# Patient Record
Sex: MALE | Race: BLACK OR AFRICAN AMERICAN | Employment: OTHER | URBAN - METROPOLITAN AREA
[De-identification: names, ages, dates, MRNs, and addresses within clinical notes are randomized per-mention and may not be internally consistent; named-entity substitution may affect disease eponyms.]

---

## 2018-09-10 ENCOUNTER — APPOINTMENT (OUTPATIENT)
Dept: ULTRASOUND IMAGING | Age: 68
End: 2018-09-10
Attending: EMERGENCY MEDICINE
Payer: MEDICARE

## 2018-09-10 ENCOUNTER — APPOINTMENT (OUTPATIENT)
Dept: CT IMAGING | Age: 68
End: 2018-09-10
Attending: EMERGENCY MEDICINE
Payer: MEDICARE

## 2018-09-10 ENCOUNTER — HOSPITAL ENCOUNTER (EMERGENCY)
Age: 68
Discharge: HOME | End: 2018-09-10
Attending: EMERGENCY MEDICINE
Payer: MEDICARE

## 2018-09-10 VITALS
WEIGHT: 315 LBS | BODY MASS INDEX: 39.17 KG/M2 | HEIGHT: 75 IN | SYSTOLIC BLOOD PRESSURE: 134 MMHG | HEART RATE: 80 BPM | OXYGEN SATURATION: 93 % | RESPIRATION RATE: 17 BRPM | TEMPERATURE: 98.1 F | DIASTOLIC BLOOD PRESSURE: 87 MMHG

## 2018-09-10 DIAGNOSIS — S76.212A GROIN STRAIN, LEFT, INITIAL ENCOUNTER: Primary | ICD-10-CM

## 2018-09-10 DIAGNOSIS — R91.1 PULMONARY NODULE: ICD-10-CM

## 2018-09-10 LAB
ALBUMIN SERPL-MCNC: 3.7 G/DL (ref 3.5–5)
ALBUMIN/GLOB SERPL: 0.9 {RATIO} (ref 1.1–2.2)
ALP SERPL-CCNC: 90 U/L (ref 45–117)
ALT SERPL-CCNC: 29 U/L (ref 12–78)
ANION GAP SERPL CALC-SCNC: 9 MMOL/L (ref 5–15)
APPEARANCE UR: CLEAR
AST SERPL-CCNC: 13 U/L (ref 15–37)
BACTERIA URNS QL MICRO: NEGATIVE /HPF
BASOPHILS # BLD: 0.2 K/UL (ref 0–0.1)
BASOPHILS NFR BLD: 1 % (ref 0–1)
BILIRUB SERPL-MCNC: 0.3 MG/DL (ref 0.2–1)
BILIRUB UR QL: NEGATIVE
BUN SERPL-MCNC: 13 MG/DL (ref 6–20)
BUN/CREAT SERPL: 13 (ref 12–20)
CALCIUM SERPL-MCNC: 8.7 MG/DL (ref 8.5–10.1)
CHLORIDE SERPL-SCNC: 99 MMOL/L (ref 97–108)
CO2 SERPL-SCNC: 25 MMOL/L (ref 21–32)
COLOR UR: NORMAL
CREAT SERPL-MCNC: 1.02 MG/DL (ref 0.7–1.3)
DIFFERENTIAL METHOD BLD: ABNORMAL
EOSINOPHIL # BLD: 0.3 K/UL (ref 0–0.4)
EOSINOPHIL NFR BLD: 2 % (ref 0–7)
EPITH CASTS URNS QL MICRO: NORMAL /LPF
ERYTHROCYTE [DISTWIDTH] IN BLOOD BY AUTOMATED COUNT: 14.3 % (ref 11.5–14.5)
GLOBULIN SER CALC-MCNC: 4 G/DL (ref 2–4)
GLUCOSE SERPL-MCNC: 196 MG/DL (ref 65–100)
GLUCOSE UR STRIP.AUTO-MCNC: NEGATIVE MG/DL
HCT VFR BLD AUTO: 44 % (ref 36.6–50.3)
HGB BLD-MCNC: 14.5 G/DL (ref 12.1–17)
HGB UR QL STRIP: NEGATIVE
HYALINE CASTS URNS QL MICRO: NORMAL /LPF (ref 0–5)
IMM GRANULOCYTES # BLD: 0.5 K/UL (ref 0–0.04)
IMM GRANULOCYTES NFR BLD AUTO: 3 % (ref 0–0.5)
KETONES UR QL STRIP.AUTO: NEGATIVE MG/DL
LEUKOCYTE ESTERASE UR QL STRIP.AUTO: NEGATIVE
LYMPHOCYTES # BLD: 2.5 K/UL (ref 0.8–3.5)
LYMPHOCYTES NFR BLD: 15 % (ref 12–49)
MCH RBC QN AUTO: 27.4 PG (ref 26–34)
MCHC RBC AUTO-ENTMCNC: 33 G/DL (ref 30–36.5)
MCV RBC AUTO: 83.2 FL (ref 80–99)
MONOCYTES # BLD: 1.2 K/UL (ref 0–1)
MONOCYTES NFR BLD: 7 % (ref 5–13)
NEUTS SEG # BLD: 12.2 K/UL (ref 1.8–8)
NEUTS SEG NFR BLD: 72 % (ref 32–75)
NITRITE UR QL STRIP.AUTO: NEGATIVE
NRBC # BLD: 0 K/UL (ref 0–0.01)
NRBC BLD-RTO: 0 PER 100 WBC
PH UR STRIP: 5 [PH] (ref 5–8)
PLATELET # BLD AUTO: 249 K/UL (ref 150–400)
PMV BLD AUTO: 11.3 FL (ref 8.9–12.9)
POTASSIUM SERPL-SCNC: 4.4 MMOL/L (ref 3.5–5.1)
PROT SERPL-MCNC: 7.7 G/DL (ref 6.4–8.2)
PROT UR STRIP-MCNC: NEGATIVE MG/DL
RBC # BLD AUTO: 5.29 M/UL (ref 4.1–5.7)
RBC #/AREA URNS HPF: NORMAL /HPF (ref 0–5)
RBC MORPH BLD: ABNORMAL
SODIUM SERPL-SCNC: 133 MMOL/L (ref 136–145)
SP GR UR REFRACTOMETRY: 1.01 (ref 1–1.03)
UA: UC IF INDICATED,UAUC: NORMAL
UROBILINOGEN UR QL STRIP.AUTO: 1 EU/DL (ref 0.2–1)
WBC # BLD AUTO: 16.9 K/UL (ref 4.1–11.1)
WBC URNS QL MICRO: NORMAL /HPF (ref 0–4)

## 2018-09-10 PROCEDURE — 96360 HYDRATION IV INFUSION INIT: CPT

## 2018-09-10 PROCEDURE — 74011250636 HC RX REV CODE- 250/636: Performed by: EMERGENCY MEDICINE

## 2018-09-10 PROCEDURE — 99285 EMERGENCY DEPT VISIT HI MDM: CPT

## 2018-09-10 PROCEDURE — 36415 COLL VENOUS BLD VENIPUNCTURE: CPT | Performed by: EMERGENCY MEDICINE

## 2018-09-10 PROCEDURE — 74177 CT ABD & PELVIS W/CONTRAST: CPT

## 2018-09-10 PROCEDURE — 85025 COMPLETE CBC W/AUTO DIFF WBC: CPT | Performed by: EMERGENCY MEDICINE

## 2018-09-10 PROCEDURE — 74011636320 HC RX REV CODE- 636/320: Performed by: EMERGENCY MEDICINE

## 2018-09-10 PROCEDURE — 80053 COMPREHEN METABOLIC PANEL: CPT | Performed by: EMERGENCY MEDICINE

## 2018-09-10 PROCEDURE — 76870 US EXAM SCROTUM: CPT

## 2018-09-10 PROCEDURE — 81001 URINALYSIS AUTO W/SCOPE: CPT | Performed by: EMERGENCY MEDICINE

## 2018-09-10 RX ORDER — HYDROCODONE BITARTRATE AND ACETAMINOPHEN 5; 325 MG/1; MG/1
1 TABLET ORAL
Qty: 15 TAB | Refills: 0 | Status: SHIPPED | OUTPATIENT
Start: 2018-09-10 | End: 2018-10-30 | Stop reason: ALTCHOICE

## 2018-09-10 RX ORDER — SODIUM CHLORIDE 9 MG/ML
50 INJECTION, SOLUTION INTRAVENOUS
Status: COMPLETED | OUTPATIENT
Start: 2018-09-10 | End: 2018-09-10

## 2018-09-10 RX ORDER — SODIUM CHLORIDE 0.9 % (FLUSH) 0.9 %
10 SYRINGE (ML) INJECTION
Status: COMPLETED | OUTPATIENT
Start: 2018-09-10 | End: 2018-09-10

## 2018-09-10 RX ADMIN — SODIUM CHLORIDE 50 ML/HR: 900 INJECTION, SOLUTION INTRAVENOUS at 09:12

## 2018-09-10 RX ADMIN — Medication 10 ML: at 09:12

## 2018-09-10 RX ADMIN — SODIUM CHLORIDE 500 ML: 900 INJECTION, SOLUTION INTRAVENOUS at 10:44

## 2018-09-10 RX ADMIN — IOPAMIDOL 100 ML: 755 INJECTION, SOLUTION INTRAVENOUS at 09:12

## 2018-09-10 NOTE — ED TRIAGE NOTES
Pt. Arrives via EMS with complaints of left sided groin dora that began 2 days ago. This morning the pain worsened and he began to have trouble with ambulation Reports he cannot put pressure on the left leg. Pt. Reports he smokes 1 pack of cigarettes per day and has visible trouble breathing, reports this is his normal breathing pattern Denies chest pain and denies SOB worse than his baseline.  
Placed on the monitor x3

## 2018-09-10 NOTE — ED PROVIDER NOTES
EMERGENCY DEPARTMENT HISTORY AND PHYSICAL EXAM 
 
 
Date: 9/10/2018 Patient Name: Kofi Ware History of Presenting Illness Chief Complaint Patient presents with  Groin Pain History Provided By: Patient HPI: Kofi Ware, 79 y.o. male with PMHx significant for HTN, HLD, and DM, presents via EMS to the ED with cc of intermittent, mild to moderate left groin pain that radiates into the left testicle x 2 days, but worsening this morning. He states his pain is exacerbated with ambulation and improved with rest. Pt reports prior to onset of sx's he had been running errands, but denies any recent heavy lifting or exertion. He denies prior hx of similar sx's. Pt specifically denies any scrotal swelling, rash, nausea, vomiting, constipation, diarrhea, dysuria, frequency, and fever. There are no other complaints, changes, or physical findings at this time. PCP: Jaci Mckeon NP Past History Past Medical History: No past medical history on file. Past Surgical History: No past surgical history on file. Family History: No family history on file. Social History: 
Social History Substance Use Topics  Smoking status: Not on file  Smokeless tobacco: Not on file  Alcohol use Not on file Allergies: 
No Known Allergies Review of Systems Review of Systems Constitutional: Negative for activity change, chills and fever. HENT: Negative for congestion and sore throat. Eyes: Negative for pain and redness. Respiratory: Negative for cough, chest tightness and shortness of breath. Cardiovascular: Negative for chest pain and palpitations. Gastrointestinal: Negative for abdominal pain, constipation, diarrhea, nausea and vomiting. Genitourinary: Positive for testicular pain (left). Negative for dysuria, frequency, scrotal swelling and urgency. + left groin pain Musculoskeletal: Negative for back pain and neck pain. Skin: Negative for rash. Neurological: Negative for syncope, light-headedness and headaches. Psychiatric/Behavioral: Negative for confusion. All other systems reviewed and are negative. Physical Exam  
Physical Exam  
Constitutional: He is oriented to person, place, and time. He appears well-developed and well-nourished. No distress. HENT:  
Head: Normocephalic. Nose: Nose normal.  
Mouth/Throat: Oropharynx is clear and moist. No oropharyngeal exudate. Eyes: Conjunctivae are normal. Pupils are equal, round, and reactive to light. No scleral icterus. Neck: Normal range of motion. Neck supple. No JVD present. No tracheal deviation present. No thyromegaly present. Cardiovascular: Normal rate, regular rhythm and intact distal pulses. Exam reveals no gallop and no friction rub. No murmur heard. Pulmonary/Chest: Effort normal and breath sounds normal. No stridor. No respiratory distress. He has no wheezes. He has no rales. Abdominal: Soft. Bowel sounds are normal. He exhibits no distension. There is no tenderness. There is no rebound and no guarding. Genitourinary:  
Genitourinary Comments: Mild left testicular swelling and tenderness Musculoskeletal: Normal range of motion. He exhibits no edema. Lymphadenopathy:  
  He has no cervical adenopathy. Neurological: He is alert and oriented to person, place, and time. No cranial nerve deficit. He exhibits normal muscle tone. Coordination normal.  
Skin: Skin is warm and dry. No rash noted. He is not diaphoretic. No erythema. Psychiatric: He has a normal mood and affect. His behavior is normal.  
Nursing note and vitals reviewed. Diagnostic Study Results Labs - Recent Results (from the past 12 hour(s)) CBC WITH AUTOMATED DIFF Collection Time: 09/10/18  7:44 AM  
Result Value Ref Range WBC 16.9 (H) 4.1 - 11.1 K/uL  
 RBC 5.29 4. 10 - 5.70 M/uL  
 HGB 14.5 12.1 - 17.0 g/dL HCT 44.0 36.6 - 50.3 %  MCV 83.2 80.0 - 99.0 FL  
 MCH 27.4 26.0 - 34.0 PG  
 MCHC 33.0 30.0 - 36.5 g/dL  
 RDW 14.3 11.5 - 14.5 % PLATELET 474 836 - 941 K/uL MPV 11.3 8.9 - 12.9 FL  
 NRBC 0.0 0  WBC ABSOLUTE NRBC 0.00 0.00 - 0.01 K/uL NEUTROPHILS 72 32 - 75 % LYMPHOCYTES 15 12 - 49 % MONOCYTES 7 5 - 13 % EOSINOPHILS 2 0 - 7 % BASOPHILS 1 0 - 1 % IMMATURE GRANULOCYTES 3 (H) 0.0 - 0.5 % ABS. NEUTROPHILS 12.2 (H) 1.8 - 8.0 K/UL  
 ABS. LYMPHOCYTES 2.5 0.8 - 3.5 K/UL  
 ABS. MONOCYTES 1.2 (H) 0.0 - 1.0 K/UL  
 ABS. EOSINOPHILS 0.3 0.0 - 0.4 K/UL  
 ABS. BASOPHILS 0.2 (H) 0.0 - 0.1 K/UL  
 ABS. IMM. GRANS. 0.5 (H) 0.00 - 0.04 K/UL  
 DF AUTOMATED    
 RBC COMMENTS NORMOCYTIC, NORMOCHROMIC METABOLIC PANEL, COMPREHENSIVE Collection Time: 09/10/18  7:44 AM  
Result Value Ref Range Sodium 133 (L) 136 - 145 mmol/L Potassium 4.4 3.5 - 5.1 mmol/L Chloride 99 97 - 108 mmol/L  
 CO2 25 21 - 32 mmol/L Anion gap 9 5 - 15 mmol/L Glucose 196 (H) 65 - 100 mg/dL BUN 13 6 - 20 MG/DL Creatinine 1.02 0.70 - 1.30 MG/DL  
 BUN/Creatinine ratio 13 12 - 20 GFR est AA >60 >60 ml/min/1.73m2 GFR est non-AA >60 >60 ml/min/1.73m2 Calcium 8.7 8.5 - 10.1 MG/DL Bilirubin, total 0.3 0.2 - 1.0 MG/DL  
 ALT (SGPT) 29 12 - 78 U/L  
 AST (SGOT) 13 (L) 15 - 37 U/L Alk. phosphatase 90 45 - 117 U/L Protein, total 7.7 6.4 - 8.2 g/dL Albumin 3.7 3.5 - 5.0 g/dL Globulin 4.0 2.0 - 4.0 g/dL A-G Ratio 0.9 (L) 1.1 - 2.2 URINALYSIS W/ REFLEX CULTURE Collection Time: 09/10/18 11:30 AM  
Result Value Ref Range Color YELLOW/STRAW Appearance CLEAR CLEAR Specific gravity 1.010 1.003 - 1.030    
 pH (UA) 5.0 5.0 - 8.0 Protein NEGATIVE  NEG mg/dL Glucose NEGATIVE  NEG mg/dL Ketone NEGATIVE  NEG mg/dL Bilirubin NEGATIVE  NEG Blood NEGATIVE  NEG Urobilinogen 1.0 0.2 - 1.0 EU/dL Nitrites NEGATIVE  NEG  Leukocyte Esterase NEGATIVE  NEG    
 UA: UC IF INDICATED CULTURE NOT INDICATED BY UA RESULT CNI    
 WBC 0-4 0 - 4 /hpf  
 RBC 0-5 0 - 5 /hpf Epithelial cells FEW FEW /lpf Bacteria NEGATIVE  NEG /hpf Hyaline cast 0-2 0 - 5 /lpf Radiologic Studies -  
US SCROTUM/TESTICLES Final Result EXAM:  US SCROTUM/TESTICLES 
  
INDICATION:  Left groin pain for 2 days. 
  
COMPARISON:  None. 
  
TECHNIQUE:  Real time and color Doppler ultrasound of the scrotal contents. 
  
FINDINGS: The right testicle measures 4.4 x 3.2 x 2.5 cm. The left testicle 
measures 5.0 x 2.6 x 2.3 cm. The testicles are normal in size and homogeneous in 
echotexture without focal lesions. There is normal arterial flow bilaterally. 
  
The epididymis is normal bilaterally. Epididymal head cysts measure 1.1 cm on 
the right and 0.8 cm on the left. There is no significant hydrocele or 
varicocele.   
  
There is no evidence for left inguinal hernia. 
  
IMPRESSION IMPRESSION:   
Normal appearance of the testicles with normal flow. No evidence for left 
inguinal hernia. CT Results  (Last 48 hours) 09/10/18 0915  CT ABD PELV W CONT Final result Impression:  IMPRESSION:   
1. There is no acute abnormality in the abdomen or pelvis. There is no   
calculus or hydronephrosis. 2. Left renal lesions are incompletely characterized due to body habitus. While  
these may represent cysts, solid lesions are not excluded. Ultrasound may be  
helpful for further characterization. 3. Hepatic steatosis. 4. Nonspecific 5 mm left lower lobe lung nodule. Guidelines by the Fleischner  
society (Radiology 2017 special report) suggest that patients with low risk for  
lung cancer and solid nodule(s) less than or equal to 6 mm in diameter require  
no follow-up. In patients with a higher risk, such as smokers, follow-up  
noncontrast chest CT should be considered at 12 months. Patients with a known malignancy are at increased risk for metastasis and should receive a three month  
follow-up. Narrative:  EXAM:  CT ABD PELV W CONT INDICATION: Left groin and pelvic pain for 2 days COMPARISON: None. TECHNIQUE: Helical CT of the abdomen  and pelvis  following the uneventful  
intravenous administration of nonionic contrast.  Coronal and sagittal reformats  
are performed. CT dose reduction was achieved through use of a standardized  
protocol tailored for this examination and automatic exposure control for dose  
modulation. FINDINGS:   
The visualized lung bases demonstrate no mass or consolidation. There is a 5 mm  
subpleural lung nodule in the left lower lobe (series 2, image 8). The heart  
size is normal. There is coronary artery atherosclerosis. There is no  
pericardial or pleural effusion. The liver is diffusely low in attenuation. The spleen, pancreas, and adrenal  
glands are normal. The gall bladder is present  without intra- or extra-hepatic  
biliary dilatation. The kidneys are symmetric without hydronephrosis. Left renal lesions are  
incompletely characterized, the larger measuring 2.5 cm. There are no dilated bowel loops. The appendix is normal.    
   
There are no enlarged lymph nodes. There is no free fluid or free air. The  
aorta tapers without aneurysm. There is mild aortoiliac atherosclerosis. The urinary bladder is normal.  Pelvic phleboliths are noted. There is no pelvic  
mass. The prostate is not enlarged. There is degenerative change at L5-S1 with bilateral L5 pars defects and 14 mm  
of anterolisthesis. There is no aggressive bony lesion. Medical Decision Making I am the first provider for this patient. I reviewed the vital signs, available nursing notes, past medical history, past surgical history, family history and social history. Vital Signs-Reviewed the patient's vital signs. Patient Vitals for the past 12 hrs: 
 Temp Pulse Resp BP SpO2  
09/10/18 1200 - 80 17 134/87 93 % 09/10/18 1145 - 79 27 - 93 % 09/10/18 1130 - - 18 - 93 % 09/10/18 1115 - 91 23 - 93 % 09/10/18 1100 - 83 16 127/76 93 % 09/10/18 1046 - 83 22 125/76 93 % 09/10/18 1045 - 83 19 - 93 % 09/10/18 1044 - 85 24 - 92 % 09/10/18 1043 - 83 25 - 93 % 09/10/18 1042 - 87 24 - 92 % 09/10/18 1041 - 92 29 - 92 % 09/10/18 1040 - 95 25 - 92 % 09/10/18 1039 - 86 23 - 93 % 09/10/18 1038 - 87 24 - 91 % 09/10/18 1037 - 88 20 - 93 % 09/10/18 1036 - 83 27 - 91 % 09/10/18 1035 - 93 24 - 92 % 09/10/18 1034 - 93 24 - 93 % 09/10/18 1033 - 87 - - 91 % 09/10/18 0932 - 82 24 - 93 % 09/10/18 0931 - 79 23 - 93 % 09/10/18 0930 - 77 24 - 93 % 09/10/18 0929 - 80 27 - 92 % 09/10/18 0928 - 86 25 - 94 % 09/10/18 0921 - - - 155/66 -  
09/10/18 0905 - 86 21 - 94 % 09/10/18 0904 - 86 21 - 93 % 09/10/18 0903 - 88 23 - 94 % 09/10/18 0902 - 86 22 - 94 % 09/10/18 0901 - 90 22 - 93 % 09/10/18 0900 - 82 20 - 93 % 09/10/18 0859 - 86 23 - 94 % 09/10/18 0858 - 84 18 - 93 % 09/10/18 0730 - 89 18 163/78 94 % 09/10/18 0722 - - - 155/67 -  
09/10/18 0720 98.1 °F (36.7 °C) 82 21 155/67 94 % Pulse Oximetry Analysis - 94% on RA Cardiac Monitor:  
Rate: 82 bpm 
Rhythm: Normal Sinus Rhythm Records Reviewed: Nursing Notes, Old Medical Records and Ambulance Run Sheet Provider Notes (Medical Decision Making): DDx: inguinal hernia, epididymitis, orchitis, groin strain ED Course:  
Initial assessment performed. The patients presenting problems have been discussed, and they are in agreement with the care plan formulated and outlined with them. I have encouraged them to ask questions as they arise throughout their visit. PROGRESS NOTE: 
7:30 AM 
Pt defers pain medication at this time. Written by Elbert Anthony ED Scribe, as dictated by Liliana Olmstead MD. Critical Care Time: 0 Disposition: 
DISCHARGE NOTE: 
12:18 PM 
The patient is ready for discharge. The patients signs, symptoms, diagnosis, and instructions for discharge have been discussed and the pt has conveyed their understanding. The patient is to follow up as recommended with PCP or return to the ER should their symptoms worsen. Plan has been discussed and patient has conveyed their agreement. Suspect groin strain as cause of sx; scrotal us without acute findings; abd pelvis ct also without acute findings; no evidence of hernia; bony pelvis/hip wnl; elevated wbc without known cause; no systemic sx, f/c, n/v; dc home with pcp follow up; he agreed to follow up regarding repeat cbc as well as repeat chest ct in 12 months to eval incidental pulmonary nodule, 5mm; he also agreed to return to ed if any worsening pain, weakness, fevers; Jessica Warren MD 
 
 
PLAN: 
1. Discharge home Discharge Medication List as of 9/10/2018 12:18 PM  
  
START taking these medications Details HYDROcodone-acetaminophen (NORCO) 5-325 mg per tablet Take 1 Tab by mouth every four (4) hours as needed for Pain. Max Daily Amount: 6 Tabs., Print, Disp-15 Tab, R-0  
  
  
 
2. Follow-up Information Follow up With Details Comments Contact Info Aron Brown NP Schedule an appointment as soon as possible for a visit in 1 day  [address] [facility] [address] [place] [address] [phone] [facility] EMERGENCY DEPT Go in 1 day If symptoms worsen [address] [facility] 
[phone] Return to ED if worse Diagnosis Clinical Impression: 1. Groin strain, left, initial encounter 2. Pulmonary nodule Attestations: This note is prepared by Jeaneth Hwang, acting as Scribe for Jessica Warren MD. Jessica Warren MD: The scribe's documentation has been prepared under my direction and personally reviewed by me in its entirety.  I confirm that the note above accurately reflects all work, treatment, procedures, and medical decision making performed by me. This note will not be viewable in [address]

## 2018-09-10 NOTE — ED NOTES
Pt transported to [address] [facility] via stretcher by radiology tech

## 2018-09-10 NOTE — DISCHARGE INSTRUCTIONS
Learning About Lung Nodules  What is a lung nodule? A lung nodule is a growth in the lung. A single nodule surrounded by lung tissue is called a solitary pulmonary nodule. A lung nodule might not cause any symptoms. Your doctor may have found one or more nodules on your lung when you were having a chest X-ray or CT scan. Or it may have been found during a lung cancer screening. A lung nodule may be caused by an old infection or cancer. It might also be a noncancerous growth. Lung nodules can cause a screening to give an abnormal result. Most nodules do not cause any harm. But without further tests, your doctor can't tell whether an abnormal finding is cancer, a harmless nodule, or something else. What can you expect when you have a lung nodule? Your doctor will look at several risk factors to see how likely it is that the nodule is cancer. He or she will look at:  · Whether you smoke or have ever smoked. · Your age and your family's medical history. · Whether you have ever had lung cancer. · The size and shape of the nodule. · Whether the nodule has changed in size. Your doctor may look at past chest X-rays or CT scans, if available, and compare them. Or you may have a series of CT scans to see if the nodule grows over time. What happens next depends on the risk of the nodule being cancer. · If you have no risk factors and the nodule is small, your doctor may advise doing nothing. · If the risk is small, your doctor may schedule follow-up appointments and tests. You may have more CT scans later to see if the nodule is growing. If the nodule hasn't changed in 3 to 6 months, you may have CT scans every year. If it hasn't changed in 2 years, you may not need any more tests. · If there's a higher risk of cancer, your doctor may:  Teressa Chand a PET scan, which may help tell if the nodule is cancerous or not. ¨ Take a sample of tissue from the nodule for testing. This is called a biopsy.   ¨ Remove the nodule with surgery. Follow-up care is a key part of your treatment and safety. Be sure to make and go to all appointments, and call your doctor if you are having problems. It's also a good idea to know your test results and keep a list of the medicines you take. Where can you learn more? Go to http://herman-tamika.info/. Enter M572 in the search box to learn more about \"Learning About Lung Nodules. \"  Current as of: May 12, 2017  Content Version: 11.7  © 9241-8026 [employer]. Care instructions adapted under license by [employer] (which disclaims liability or warranty for this information). If you have questions about a medical condition or this instruction, always ask your healthcare professional. [address] any warranty or liability for your use of this information. Groin Strain: Care Instructions  Your Care Instructions  A groin strain is an injury that happens when you tear or overstretch (pull) a groin muscle. The groin muscles are in the area on either side of the body in the folds where the belly joins the legs. You can strain a groin muscle during exercise, such as running, skating, kicking in soccer, or playing basketball. It can happen when you lift, push, or pull heavy objects. You might pull a groin muscle when you fall. The injury can range from a minor pull to a more serious tear of the muscle. You may feel pain and tenderness that's worse when you squeeze your legs together. You may also have pain when you raise the knee of the injured side. There may be swelling or bruising in the groin area or inner thigh. If you have a bad strain, you may walk with a limp while it heals. Rest and other home care can help the muscle heal. Healing can take up to 3 weeks or more. Your doctor may want to see you again in 2 to 3 weeks. Follow-up care is a key part of your treatment and safety.  Be sure to make and go to all appointments, and call your doctor if you are having problems. It's also a good idea to know your test results and keep a list of the medicines you take. How can you care for yourself at home? · Be safe with medicines. Read and follow all instructions on the label. ¨ If the doctor gave you a prescription medicine for pain, take it as prescribed. ¨ If you are not taking a prescription pain medicine, ask your doctor if you can take an over-the-counter medicine. · Rest and protect your injured or sore groin area for 1 to 2 weeks. Stop, change, or take a break from any activity that may be causing your pain or soreness. Do not do intense activities while you still have pain. · Put ice or a cold pack on your groin area for 10 to 20 minutes at a time. Try to do this every 1 to 2 hours for the next 3 days (when you are awake) or until the swelling goes down. Put a thin cloth between the ice and your skin. · After 2 or 3 days, if your swelling is gone, apply heat. Put a warm water bottle, a heating pad set on low, or a warm cloth on your groin area. Do not go to sleep with a heating pad on your skin. · If your doctor gave you crutches, make sure you use them as directed. · Wear snug shorts or underwear that support the injured area. When should you call for help? Call your doctor now or seek immediate medical care if:    · You have new or severe pain or swelling in the groin area.     · Your groin or upper thigh is cool or pale or changes color.     · You have tingling, weakness, or numbness in your groin or leg.     · You cannot move your leg.     · You cannot put weight on your leg.    Watch closely for changes in your health, and be sure to contact your doctor if:    · You do not get better as expected. Where can you learn more? Go to http://herman-tamika.info/. Enter P390 in the search box to learn more about \"Groin Strain: Care Instructions. \"  Current as of: November 29, 2017  Content Version: 11.7  © 7153-6435 [employer]. Care instructions adapted under license by [employer] (which disclaims liability or warranty for this information). If you have questions about a medical condition or this instruction, always ask your healthcare professional. [place] [address] any warranty or liability for your use of this information.

## 2018-09-10 NOTE — Clinical Note
The radiologist recommended you have a repeat chest ct scan in 12 months to reevaluate a pulmonary nodule found today.

## 2018-10-30 ENCOUNTER — APPOINTMENT (OUTPATIENT)
Dept: MRI IMAGING | Age: 68
DRG: 064 | End: 2018-10-30
Attending: INTERNAL MEDICINE
Payer: MEDICARE

## 2018-10-30 ENCOUNTER — APPOINTMENT (OUTPATIENT)
Dept: GENERAL RADIOLOGY | Age: 68
DRG: 064 | End: 2018-10-30
Attending: EMERGENCY MEDICINE
Payer: MEDICARE

## 2018-10-30 ENCOUNTER — APPOINTMENT (OUTPATIENT)
Dept: CT IMAGING | Age: 68
DRG: 064 | End: 2018-10-30
Attending: EMERGENCY MEDICINE
Payer: MEDICARE

## 2018-10-30 ENCOUNTER — HOSPITAL ENCOUNTER (INPATIENT)
Age: 68
LOS: 5 days | Discharge: NURSING, REHAB OR LONG-TERM CARE | DRG: 064 | End: 2018-11-04
Attending: EMERGENCY MEDICINE | Admitting: INTERNAL MEDICINE
Payer: MEDICARE

## 2018-10-30 ENCOUNTER — APPOINTMENT (OUTPATIENT)
Dept: ULTRASOUND IMAGING | Age: 68
DRG: 064 | End: 2018-10-30
Attending: INTERNAL MEDICINE
Payer: MEDICARE

## 2018-10-30 DIAGNOSIS — H54.62 VISION LOSS OF LEFT EYE: ICD-10-CM

## 2018-10-30 DIAGNOSIS — I63.331 CEREBROVASCULAR ACCIDENT (CVA) DUE TO THROMBOSIS OF RIGHT POSTERIOR CEREBRAL ARTERY (HCC): ICD-10-CM

## 2018-10-30 DIAGNOSIS — I63.019 CEREBROVASCULAR ACCIDENT (CVA) DUE TO THROMBOSIS OF VERTEBRAL ARTERY, UNSPECIFIED BLOOD VESSEL LATERALITY (HCC): Primary | ICD-10-CM

## 2018-10-30 DIAGNOSIS — H53.462 LEFT HOMONYMOUS HEMIANOPSIA: ICD-10-CM

## 2018-10-30 DIAGNOSIS — I21.4 NSTEMI (NON-ST ELEVATED MYOCARDIAL INFARCTION) (HCC): ICD-10-CM

## 2018-10-30 DIAGNOSIS — I48.0 PAF (PAROXYSMAL ATRIAL FIBRILLATION) (HCC): ICD-10-CM

## 2018-10-30 DIAGNOSIS — Z72.0 TOBACCO ABUSE: ICD-10-CM

## 2018-10-30 DIAGNOSIS — I48.0 PAROXYSMAL ATRIAL FIBRILLATION (HCC): ICD-10-CM

## 2018-10-30 PROBLEM — G45.9 TIA (TRANSIENT ISCHEMIC ATTACK): Status: ACTIVE | Noted: 2018-10-30

## 2018-10-30 PROBLEM — R77.8 ELEVATED TROPONIN: Status: ACTIVE | Noted: 2018-10-30

## 2018-10-30 PROBLEM — I63.9 STROKE (CEREBRUM) (HCC): Status: ACTIVE | Noted: 2018-10-30

## 2018-10-30 LAB
ALBUMIN SERPL-MCNC: 3.6 G/DL (ref 3.5–5)
ALBUMIN/GLOB SERPL: 1 {RATIO} (ref 1.1–2.2)
ALP SERPL-CCNC: 87 U/L (ref 45–117)
ALT SERPL-CCNC: 31 U/L (ref 12–78)
AMPHET UR QL SCN: NEGATIVE
ANION GAP SERPL CALC-SCNC: 5 MMOL/L (ref 5–15)
APPEARANCE UR: CLEAR
APTT PPP: 26.2 SEC (ref 22.1–32)
AST SERPL-CCNC: 21 U/L (ref 15–37)
BACTERIA URNS QL MICRO: NEGATIVE /HPF
BARBITURATES UR QL SCN: NEGATIVE
BASOPHILS # BLD: 0.1 K/UL (ref 0–0.1)
BASOPHILS NFR BLD: 1 % (ref 0–1)
BENZODIAZ UR QL: NEGATIVE
BILIRUB SERPL-MCNC: 0.4 MG/DL (ref 0.2–1)
BILIRUB UR QL: NEGATIVE
BUN SERPL-MCNC: 19 MG/DL (ref 6–20)
BUN/CREAT SERPL: 19 (ref 12–20)
CALCIUM SERPL-MCNC: 8.2 MG/DL (ref 8.5–10.1)
CANNABINOIDS UR QL SCN: NEGATIVE
CHLORIDE SERPL-SCNC: 101 MMOL/L (ref 97–108)
CO2 SERPL-SCNC: 28 MMOL/L (ref 21–32)
COCAINE UR QL SCN: NEGATIVE
COLOR UR: ABNORMAL
COMMENT, HOLDF: NORMAL
CREAT BLD-MCNC: 1 MG/DL (ref 0.6–1.3)
CREAT SERPL-MCNC: 1.01 MG/DL (ref 0.7–1.3)
DIFFERENTIAL METHOD BLD: ABNORMAL
DRUG SCRN COMMENT,DRGCM: NORMAL
EOSINOPHIL # BLD: 0.4 K/UL (ref 0–0.4)
EOSINOPHIL NFR BLD: 2 % (ref 0–7)
EPITH CASTS URNS QL MICRO: ABNORMAL /LPF
ERYTHROCYTE [DISTWIDTH] IN BLOOD BY AUTOMATED COUNT: 13.7 % (ref 11.5–14.5)
GLOBULIN SER CALC-MCNC: 3.7 G/DL (ref 2–4)
GLUCOSE BLD STRIP.AUTO-MCNC: 148 MG/DL (ref 65–100)
GLUCOSE SERPL-MCNC: 164 MG/DL (ref 65–100)
GLUCOSE UR STRIP.AUTO-MCNC: NEGATIVE MG/DL
HCT VFR BLD AUTO: 39.2 % (ref 36.6–50.3)
HGB BLD-MCNC: 12.7 G/DL (ref 12.1–17)
HGB UR QL STRIP: NEGATIVE
HYALINE CASTS URNS QL MICRO: ABNORMAL /LPF (ref 0–5)
IMM GRANULOCYTES # BLD: 0.2 K/UL (ref 0–0.04)
IMM GRANULOCYTES NFR BLD AUTO: 1 % (ref 0–0.5)
INR PPP: 1.2 (ref 0.9–1.1)
KETONES UR QL STRIP.AUTO: NEGATIVE MG/DL
LEUKOCYTE ESTERASE UR QL STRIP.AUTO: NEGATIVE
LYMPHOCYTES # BLD: 2 K/UL (ref 0.8–3.5)
LYMPHOCYTES NFR BLD: 13 % (ref 12–49)
MCH RBC QN AUTO: 27.3 PG (ref 26–34)
MCHC RBC AUTO-ENTMCNC: 32.4 G/DL (ref 30–36.5)
MCV RBC AUTO: 84.3 FL (ref 80–99)
METHADONE UR QL: NEGATIVE
MONOCYTES # BLD: 1.3 K/UL (ref 0–1)
MONOCYTES NFR BLD: 8 % (ref 5–13)
NEUTS SEG # BLD: 11.8 K/UL (ref 1.8–8)
NEUTS SEG NFR BLD: 75 % (ref 32–75)
NITRITE UR QL STRIP.AUTO: NEGATIVE
NRBC # BLD: 0 K/UL (ref 0–0.01)
NRBC BLD-RTO: 0 PER 100 WBC
OPIATES UR QL: NEGATIVE
PCP UR QL: NEGATIVE
PH UR STRIP: 6 [PH] (ref 5–8)
PLATELET # BLD AUTO: 186 K/UL (ref 150–400)
PMV BLD AUTO: 11 FL (ref 8.9–12.9)
POTASSIUM SERPL-SCNC: 4.2 MMOL/L (ref 3.5–5.1)
PROT SERPL-MCNC: 7.3 G/DL (ref 6.4–8.2)
PROT UR STRIP-MCNC: 30 MG/DL
PROTHROMBIN TIME: 12 SEC (ref 9–11.1)
RBC # BLD AUTO: 4.65 M/UL (ref 4.1–5.7)
RBC #/AREA URNS HPF: ABNORMAL /HPF (ref 0–5)
SAMPLES BEING HELD,HOLD: NORMAL
SERVICE CMNT-IMP: ABNORMAL
SODIUM SERPL-SCNC: 134 MMOL/L (ref 136–145)
SP GR UR REFRACTOMETRY: 1.02 (ref 1–1.03)
THERAPEUTIC RANGE,PTTT: NORMAL SECS (ref 58–77)
TROPONIN I SERPL-MCNC: 0.65 NG/ML
TSH SERPL DL<=0.05 MIU/L-ACNC: 0.27 UIU/ML (ref 0.36–3.74)
UA: UC IF INDICATED,UAUC: ABNORMAL
UROBILINOGEN UR QL STRIP.AUTO: 1 EU/DL (ref 0.2–1)
VIT B12 SERPL-MCNC: 291 PG/ML (ref 193–986)
WBC # BLD AUTO: 15.7 K/UL (ref 4.1–11.1)
WBC URNS QL MICRO: ABNORMAL /HPF (ref 0–4)

## 2018-10-30 PROCEDURE — 74011636320 HC RX REV CODE- 636/320: Performed by: EMERGENCY MEDICINE

## 2018-10-30 PROCEDURE — 80053 COMPREHEN METABOLIC PANEL: CPT | Performed by: EMERGENCY MEDICINE

## 2018-10-30 PROCEDURE — 70450 CT HEAD/BRAIN W/O DYE: CPT

## 2018-10-30 PROCEDURE — 84443 ASSAY THYROID STIM HORMONE: CPT | Performed by: PSYCHIATRY & NEUROLOGY

## 2018-10-30 PROCEDURE — 74011250636 HC RX REV CODE- 250/636: Performed by: EMERGENCY MEDICINE

## 2018-10-30 PROCEDURE — 80307 DRUG TEST PRSMV CHEM ANLYZR: CPT | Performed by: PSYCHIATRY & NEUROLOGY

## 2018-10-30 PROCEDURE — 82962 GLUCOSE BLOOD TEST: CPT

## 2018-10-30 PROCEDURE — 85025 COMPLETE CBC W/AUTO DIFF WBC: CPT | Performed by: EMERGENCY MEDICINE

## 2018-10-30 PROCEDURE — 93005 ELECTROCARDIOGRAM TRACING: CPT

## 2018-10-30 PROCEDURE — 85730 THROMBOPLASTIN TIME PARTIAL: CPT | Performed by: EMERGENCY MEDICINE

## 2018-10-30 PROCEDURE — 99285 EMERGENCY DEPT VISIT HI MDM: CPT

## 2018-10-30 PROCEDURE — 85610 PROTHROMBIN TIME: CPT | Performed by: EMERGENCY MEDICINE

## 2018-10-30 PROCEDURE — 82565 ASSAY OF CREATININE: CPT

## 2018-10-30 PROCEDURE — 74011250637 HC RX REV CODE- 250/637: Performed by: EMERGENCY MEDICINE

## 2018-10-30 PROCEDURE — 71045 X-RAY EXAM CHEST 1 VIEW: CPT

## 2018-10-30 PROCEDURE — 74011250636 HC RX REV CODE- 250/636: Performed by: PSYCHIATRY & NEUROLOGY

## 2018-10-30 PROCEDURE — 81001 URINALYSIS AUTO W/SCOPE: CPT | Performed by: EMERGENCY MEDICINE

## 2018-10-30 PROCEDURE — 96372 THER/PROPH/DIAG INJ SC/IM: CPT

## 2018-10-30 PROCEDURE — 65270000029 HC RM PRIVATE

## 2018-10-30 PROCEDURE — 84484 ASSAY OF TROPONIN QUANT: CPT | Performed by: EMERGENCY MEDICINE

## 2018-10-30 PROCEDURE — 36415 COLL VENOUS BLD VENIPUNCTURE: CPT | Performed by: EMERGENCY MEDICINE

## 2018-10-30 PROCEDURE — 70498 CT ANGIOGRAPHY NECK: CPT

## 2018-10-30 PROCEDURE — 93970 EXTREMITY STUDY: CPT

## 2018-10-30 PROCEDURE — 82607 VITAMIN B-12: CPT | Performed by: PSYCHIATRY & NEUROLOGY

## 2018-10-30 RX ORDER — MAGNESIUM SULFATE 100 %
4 CRYSTALS MISCELLANEOUS AS NEEDED
Status: DISCONTINUED | OUTPATIENT
Start: 2018-10-30 | End: 2018-11-04 | Stop reason: HOSPADM

## 2018-10-30 RX ORDER — LISINOPRIL 5 MG/1
5 TABLET ORAL DAILY
COMMUNITY

## 2018-10-30 RX ORDER — ALBUTEROL SULFATE 90 UG/1
2 AEROSOL, METERED RESPIRATORY (INHALATION)
Status: DISCONTINUED | OUTPATIENT
Start: 2018-10-30 | End: 2018-11-04 | Stop reason: HOSPADM

## 2018-10-30 RX ORDER — SODIUM CHLORIDE 0.9 % (FLUSH) 0.9 %
5-10 SYRINGE (ML) INJECTION AS NEEDED
Status: DISCONTINUED | OUTPATIENT
Start: 2018-10-30 | End: 2018-11-04 | Stop reason: HOSPADM

## 2018-10-30 RX ORDER — SODIUM CHLORIDE 0.9 % (FLUSH) 0.9 %
10 SYRINGE (ML) INJECTION
Status: COMPLETED | OUTPATIENT
Start: 2018-10-30 | End: 2018-10-30

## 2018-10-30 RX ORDER — CLOPIDOGREL BISULFATE 75 MG/1
75 TABLET ORAL DAILY
Status: DISCONTINUED | OUTPATIENT
Start: 2018-10-31 | End: 2018-11-01

## 2018-10-30 RX ORDER — SODIUM CHLORIDE 9 MG/ML
75 INJECTION, SOLUTION INTRAVENOUS CONTINUOUS
Status: ACTIVE | OUTPATIENT
Start: 2018-10-30 | End: 2018-10-31

## 2018-10-30 RX ORDER — SODIUM CHLORIDE 0.9 % (FLUSH) 0.9 %
5-10 SYRINGE (ML) INJECTION EVERY 8 HOURS
Status: DISCONTINUED | OUTPATIENT
Start: 2018-10-30 | End: 2018-11-04 | Stop reason: HOSPADM

## 2018-10-30 RX ORDER — BISACODYL 5 MG
5 TABLET, DELAYED RELEASE (ENTERIC COATED) ORAL DAILY PRN
Status: DISCONTINUED | OUTPATIENT
Start: 2018-10-30 | End: 2018-11-04 | Stop reason: HOSPADM

## 2018-10-30 RX ORDER — ENOXAPARIN SODIUM 100 MG/ML
40 INJECTION SUBCUTANEOUS EVERY 12 HOURS
Status: DISCONTINUED | OUTPATIENT
Start: 2018-10-30 | End: 2018-10-31

## 2018-10-30 RX ORDER — METFORMIN HYDROCHLORIDE 1000 MG/1
1000 TABLET ORAL 2 TIMES DAILY WITH MEALS
COMMUNITY

## 2018-10-30 RX ORDER — INSULIN LISPRO 100 [IU]/ML
INJECTION, SOLUTION INTRAVENOUS; SUBCUTANEOUS
Status: DISCONTINUED | OUTPATIENT
Start: 2018-10-30 | End: 2018-11-04 | Stop reason: HOSPADM

## 2018-10-30 RX ORDER — ALBUTEROL SULFATE 90 UG/1
2 AEROSOL, METERED RESPIRATORY (INHALATION)
COMMUNITY

## 2018-10-30 RX ORDER — ACETAMINOPHEN 650 MG/1
650 SUPPOSITORY RECTAL
Status: DISCONTINUED | OUTPATIENT
Start: 2018-10-30 | End: 2018-11-04 | Stop reason: HOSPADM

## 2018-10-30 RX ORDER — ROSUVASTATIN CALCIUM 10 MG/1
10 TABLET, COATED ORAL
COMMUNITY

## 2018-10-30 RX ORDER — LABETALOL HYDROCHLORIDE 5 MG/ML
5 INJECTION, SOLUTION INTRAVENOUS
Status: DISCONTINUED | OUTPATIENT
Start: 2018-10-30 | End: 2018-11-04 | Stop reason: HOSPADM

## 2018-10-30 RX ORDER — DEXTROSE 50 % IN WATER (D50W) INTRAVENOUS SYRINGE
25-50 AS NEEDED
Status: DISCONTINUED | OUTPATIENT
Start: 2018-10-30 | End: 2018-11-04 | Stop reason: HOSPADM

## 2018-10-30 RX ORDER — SODIUM CHLORIDE 9 MG/ML
50 INJECTION, SOLUTION INTRAVENOUS
Status: COMPLETED | OUTPATIENT
Start: 2018-10-30 | End: 2018-10-30

## 2018-10-30 RX ORDER — GUAIFENESIN 100 MG/5ML
81 LIQUID (ML) ORAL DAILY
Status: DISCONTINUED | OUTPATIENT
Start: 2018-10-31 | End: 2018-10-31

## 2018-10-30 RX ORDER — IBUPROFEN 200 MG
1 TABLET ORAL DAILY
Status: DISCONTINUED | OUTPATIENT
Start: 2018-10-31 | End: 2018-11-04 | Stop reason: HOSPADM

## 2018-10-30 RX ORDER — PRAVASTATIN SODIUM 40 MG/1
80 TABLET ORAL
Status: DISCONTINUED | OUTPATIENT
Start: 2018-10-30 | End: 2018-11-04 | Stop reason: HOSPADM

## 2018-10-30 RX ORDER — ATORVASTATIN CALCIUM 20 MG/1
20 TABLET, FILM COATED ORAL
Status: DISCONTINUED | OUTPATIENT
Start: 2018-10-30 | End: 2018-10-30

## 2018-10-30 RX ORDER — ACETAMINOPHEN 325 MG/1
650 TABLET ORAL
Status: DISCONTINUED | OUTPATIENT
Start: 2018-10-30 | End: 2018-10-30

## 2018-10-30 RX ORDER — ACETAMINOPHEN 325 MG/1
650 TABLET ORAL
Status: DISCONTINUED | OUTPATIENT
Start: 2018-10-30 | End: 2018-11-04 | Stop reason: HOSPADM

## 2018-10-30 RX ORDER — NICARDIPINE HYDROCHLORIDE 0.1 MG/ML
5-15 INJECTION INTRAVENOUS
Status: DISCONTINUED | OUTPATIENT
Start: 2018-10-30 | End: 2018-10-31

## 2018-10-30 RX ORDER — POLYETHYLENE GLYCOL 3350 17 G/17G
17 POWDER, FOR SOLUTION ORAL DAILY
Status: DISCONTINUED | OUTPATIENT
Start: 2018-10-31 | End: 2018-11-04 | Stop reason: HOSPADM

## 2018-10-30 RX ORDER — ASPIRIN 325 MG
325 TABLET ORAL
Status: COMPLETED | OUTPATIENT
Start: 2018-10-30 | End: 2018-10-30

## 2018-10-30 RX ORDER — CLINDAMYCIN HYDROCHLORIDE 300 MG/1
300 CAPSULE ORAL 3 TIMES DAILY
COMMUNITY
End: 2018-11-04

## 2018-10-30 RX ADMIN — Medication 10 ML: at 14:49

## 2018-10-30 RX ADMIN — ASPIRIN 325 MG: 325 TABLET ORAL at 13:53

## 2018-10-30 RX ADMIN — IOPAMIDOL 100 ML: 755 INJECTION, SOLUTION INTRAVENOUS at 12:27

## 2018-10-30 RX ADMIN — SODIUM CHLORIDE 75 ML/HR: 900 INJECTION, SOLUTION INTRAVENOUS at 14:46

## 2018-10-30 RX ADMIN — Medication 10 ML: at 12:08

## 2018-10-30 RX ADMIN — SODIUM CHLORIDE 50 ML/HR: 900 INJECTION, SOLUTION INTRAVENOUS at 12:27

## 2018-10-30 RX ADMIN — ENOXAPARIN SODIUM 40 MG: 40 INJECTION, SOLUTION INTRAVENOUS; SUBCUTANEOUS at 14:54

## 2018-10-30 RX ADMIN — Medication 10 ML: at 14:50

## 2018-10-30 NOTE — PROGRESS NOTES
-Please complete MRI History and Safety Screening Form for this patient using [facility] only under Orders Requiring a Screening Form: 
 

## 2018-10-30 NOTE — ED NOTES
Pt states after leaving his chiropractor office yesterday morning at 0830 am he had complete loss of vision in both eyes. Denies headache. States he can only see out of his peripheral vision. Pt presentation discussed with Dr Ana Mensah and verbal orders received. Spoke with Tamela Almaraz in [facility] [facility] and pt will go to room 1 for his ct
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
## 2018-10-30 NOTE — ED PROVIDER NOTES
EMERGENCY DEPARTMENT HISTORY AND PHYSICAL EXAM 
 
 
Date: 10/30/2018 Patient Name: Wendy Horvath History of Presenting Illness Chief Complaint Patient presents with  Loss of Vision  
  states after leaving his chiropractor yesterday morning at 0830 he has been unable to see. complete loss of vision in bilateral eyes. reports he just got new glasses. denies any headaches. moving all extremities. A&Ox4 History Provided By: Patient HPI: Wendy Horvath, 79 y.o. male with PMHx significant for DM, HTN, COPD presents ambulatory to the ED with cc of acute onset bilateral blurry vision that onset yesterday after seeing his chiropractor. Pt states his sxs onset after a neck manipulation with his chiropractor yesterday when he was driving home from his appointment. He reports \"jumping a curb. \" Pt states that his sxs are no better or worse than yesterday. He notes that he just got new glasses last week. Pt confirms smoking but notes he is trying to quit. He denies associated neck pain or HA. There are no other complaints, changes, or physical findings at this time. PCP: Rosita Sethi NP Current Facility-Administered Medications Medication Dose Route Frequency Provider Last Rate Last Dose  sodium chloride (NS) flush 5-10 mL  5-10 mL IntraVENous Q8H Mary MARTINEZ MD   10 mL at 10/30/18 1450  sodium chloride (NS) flush 5-10 mL  5-10 mL IntraVENous PRN Tracie Mc MD      
 sodium chloride (NS) flush 5-10 mL  5-10 mL IntraVENous Q8H Babatunde Coronado MD   10 mL at 10/30/18 1449  sodium chloride (NS) flush 5-10 mL  5-10 mL IntraVENous PRN Hegab, Jeri Heimlich, MD      
 acetaminophen (TYLENOL) suppository 650 mg  650 mg Rectal Q4H PRN Babatunde Coronado MD      
[facility] Doing ON 10/31/2018] clopidogrel (PLAVIX) tablet 75 mg  75 mg Oral DAILY Babatunde Coronado MD      
[facility] Doing ON 10/31/2018] aspirin chewable tablet 81 mg  81 mg Oral DAILY Babatunde Coronado MD      
  enoxaparin (LOVENOX) injection 40 mg  40 mg SubCUTAneous Q12H Mckayla Pace MD   40 mg at 10/30/18 1454  
 0.9% sodium chloride infusion  75 mL/hr IntraVENous CONTINUOUS Jose Medrano MD 75 mL/hr at 10/30/18 1446 75 mL/hr at 10/30/18 1446  sodium chloride (NS) flush 5-10 mL  5-10 mL IntraVENous Q8H Deep Grain., MD      
 sodium chloride (NS) flush 5-10 mL  5-10 mL IntraVENous PRN Deep Grain., MD      
 acetaminophen (TYLENOL) tablet 650 mg  650 mg Oral Q4H PRN Deep Grain., MD      
 Or  
 acetaminophen (TYLENOL) solution 650 mg  650 mg Per NG tube Q4H PRN Deep Grain., MD      
 Or  
 acetaminophen (TYLENOL) suppository 650 mg  650 mg Rectal Q4H PRN Deep Grain., MD      
 bisacodyl (DULCOLAX) tablet 5 mg  5 mg Oral DAILY PRN Deep Grain., MD      
 [START ON 10/31/2018] polyethylene glycol (MIRALAX) packet 17 g  17 g Oral DAILY Deep Grain., MD      
 albuterol (PROVENTIL HFA, VENTOLIN HFA, PROAIR HFA) inhaler 2 Puff  2 Puff Inhalation Q4H PRN Deep Grain., MD      
 [START ON 10/31/2018] umeclidinium-vilanterol (ANORO ELLIPTA) 62.5 mcg- 25 mcg/inhalation  1 Puff Inhalation DAILY Deep Grain., MD      
 labetalol (NORMODYNE;TRANDATE) injection 5 mg  5 mg IntraVENous Q10MIN PRN Deep Grain., MD      
 niCARdipine in Saline (CARDENE) 0.1mg/mL 200 ml premix infusion  5-15 mg/hr IntraVENous TITRATE Deep Bret., MD      
 [START ON 10/31/2018] nicotine (NICODERM CQ) 21 mg/24 hr patch 1 Patch  1 Patch TransDERmal DAILY Deep Grain., MD      
 glucose chewable tablet 16 g  4 Tab Oral PRN Deep Grain., MD      
 dextrose (D50W) injection syrg 12.5-25 g  25-50 mL IntraVENous PRN Deep Grain., MD      
 glucagon (GLUCAGEN) injection 1 mg  1 mg IntraMUSCular PRN Deep Grain., MD      
 insulin lispro (HUMALOG) injection   SubCUTAneous AC&HS Deep Arias MD      
  pravastatin (PRAVACHOL) tablet 80 mg  80 mg Oral QHS Geoff Valadez MD      
 
Current Outpatient Medications Medication Sig Dispense Refill  clindamycin (CLEOCIN) 300 mg capsule Take 300 mg by mouth three (3) times daily.  lisinopril (PRINIVIL, ZESTRIL) 5 mg tablet Take 5 mg by mouth daily.  metFORMIN (GLUCOPHAGE) 1,000 mg tablet Take 1,000 mg by mouth two (2) times daily (with meals).  rosuvastatin (CRESTOR) 10 mg tablet Take 10 mg by mouth nightly.  umeclidinium-vilanterol (ANORO ELLIPTA) 62.5-25 mcg/actuation inhaler Take 1 Puff by inhalation daily.  albuterol (VENTOLIN HFA) 90 mcg/actuation inhaler Take 2 Puffs by inhalation every four (4) hours as needed for Wheezing. Past History Past Medical History: 
Past Medical History:  
Diagnosis Date  Tobacco abuse 10/30/2018 Past Surgical History: 
History reviewed. No pertinent surgical history. Family History: 
History reviewed. No pertinent family history. Social History: 
Social History Tobacco Use  Smoking status: Heavy Tobacco Smoker Packs/day: 2.00  Smokeless tobacco: Never Used Substance Use Topics  Alcohol use: No  
  Frequency: Never  Drug use: No  
 
 
Allergies: 
No Known Allergies Review of Systems Review of Systems Constitutional: Negative. Negative for chills and fever. HENT: Negative. Negative for congestion, rhinorrhea and sinus pressure. Eyes: Positive for visual disturbance (bilateral blurry vision). Negative for discharge and redness. Respiratory: Negative. Negative for chest tightness and shortness of breath. Cardiovascular: Negative. Negative for chest pain. Gastrointestinal: Negative. Negative for abdominal pain and blood in stool. Endocrine: Negative. Genitourinary: Negative. Negative for flank pain and hematuria. Musculoskeletal: Negative. Negative for back pain and neck pain. Skin: Negative. Negative for rash. Neurological: Negative. Negative for dizziness, seizures, weakness, numbness and headaches. Hematological: Negative. All other systems reviewed and are negative. Physical Exam  
Physical Exam  
Constitutional: He is oriented to person, place, and time. He appears well-developed and well-nourished. No distress. HENT:  
Head: Normocephalic and atraumatic. Nose: Nose normal.  
Mouth/Throat: No oropharyngeal exudate. Eyes: Conjunctivae and EOM are normal. Pupils are equal, round, and reactive to light. No scleral icterus. Right eye exhibits normal extraocular motion. Left eye exhibits normal extraocular motion. Right pupil is reactive. Left pupil is reactive. Pupils reactive to near and accomodation Left eye complete vision compromise, right eye vision appears intact when eyes are covered Neck: Normal range of motion. Neck supple. No JVD present. No thyromegaly present. Cardiovascular: Normal rate, regular rhythm, normal heart sounds, intact distal pulses and normal pulses. PMI is not displaced. Exam reveals no gallop and no friction rub. No murmur heard. Pulmonary/Chest: Effort normal and breath sounds normal. No stridor. No respiratory distress. He has no decreased breath sounds. He has no wheezes. He has no rhonchi. He has no rales. He exhibits no tenderness. Abdominal: Soft. Normal aorta and bowel sounds are normal. He exhibits no distension, no abdominal bruit and no mass. There is no hepatosplenomegaly. There is no tenderness. There is no rebound, no guarding and no CVA tenderness. No hernia. Neurological: He is alert and oriented to person, place, and time. He has normal strength and normal reflexes. He displays no atrophy and no tremor. A cranial nerve deficit is present. No sensory deficit. He exhibits normal muscle tone. He displays no seizure activity. Coordination normal. GCS eye subscore is 4. GCS verbal subscore is 5. GCS motor subscore is 6. Reflex Scores: Patellar reflexes are 2+ on the right side and 2+ on the left side. Monocular vision loss(painless) Skin: Skin is warm. No rash noted. He is not diaphoretic. No erythema. Nursing note and vitals reviewed. Diagnostic Study Results Labs - Recent Results (from the past 12 hour(s)) POC CREATININE Collection Time: 10/30/18 12:11 PM  
Result Value Ref Range Creatinine (POC) 1.0 0.6 - 1.3 mg/dL GFRAA, POC >60 >60 ml/min/1.73m2 GFRNA, POC >60 >60 ml/min/1.73m2 CBC WITH AUTOMATED DIFF Collection Time: 10/30/18  1:01 PM  
Result Value Ref Range WBC 15.7 (H) 4.1 - 11.1 K/uL  
 RBC 4.65 4.10 - 5.70 M/uL  
 HGB 12.7 12.1 - 17.0 g/dL HCT 39.2 36.6 - 50.3 % MCV 84.3 80.0 - 99.0 FL  
 MCH 27.3 26.0 - 34.0 PG  
 MCHC 32.4 30.0 - 36.5 g/dL  
 RDW 13.7 11.5 - 14.5 % PLATELET 901 804 - 539 K/uL MPV 11.0 8.9 - 12.9 FL  
 NRBC 0.0 0  WBC ABSOLUTE NRBC 0.00 0.00 - 0.01 K/uL NEUTROPHILS 75 32 - 75 % LYMPHOCYTES 13 12 - 49 % MONOCYTES 8 5 - 13 % EOSINOPHILS 2 0 - 7 % BASOPHILS 1 0 - 1 % IMMATURE GRANULOCYTES 1 (H) 0.0 - 0.5 % ABS. NEUTROPHILS 11.8 (H) 1.8 - 8.0 K/UL  
 ABS. LYMPHOCYTES 2.0 0.8 - 3.5 K/UL  
 ABS. MONOCYTES 1.3 (H) 0.0 - 1.0 K/UL  
 ABS. EOSINOPHILS 0.4 0.0 - 0.4 K/UL  
 ABS. BASOPHILS 0.1 0.0 - 0.1 K/UL  
 ABS. IMM. GRANS. 0.2 (H) 0.00 - 0.04 K/UL  
 DF AUTOMATED METABOLIC PANEL, COMPREHENSIVE Collection Time: 10/30/18  1:01 PM  
Result Value Ref Range Sodium 134 (L) 136 - 145 mmol/L Potassium 4.2 3.5 - 5.1 mmol/L Chloride 101 97 - 108 mmol/L  
 CO2 28 21 - 32 mmol/L Anion gap 5 5 - 15 mmol/L Glucose 164 (H) 65 - 100 mg/dL BUN 19 6 - 20 MG/DL Creatinine 1.01 0.70 - 1.30 MG/DL  
 BUN/Creatinine ratio 19 12 - 20 GFR est AA >60 >60 ml/min/1.73m2 GFR est non-AA >60 >60 ml/min/1.73m2 Calcium 8.2 (L) 8.5 - 10.1 MG/DL  Bilirubin, total 0.4 0.2 - 1.0 MG/DL  
 ALT (SGPT) 31 12 - 78 U/L  
 AST (SGOT) 21 15 - 37 U/L Alk. phosphatase 87 45 - 117 U/L Protein, total 7.3 6.4 - 8.2 g/dL Albumin 3.6 3.5 - 5.0 g/dL Globulin 3.7 2.0 - 4.0 g/dL A-G Ratio 1.0 (L) 1.1 - 2.2 PTT Collection Time: 10/30/18  1:01 PM  
Result Value Ref Range aPTT 26.2 22.1 - 32.0 sec  
 aPTT, therapeutic range     58.0 - 77.0 SECS  
PROTHROMBIN TIME + INR Collection Time: 10/30/18  1:01 PM  
Result Value Ref Range INR 1.2 (H) 0.9 - 1.1 Prothrombin time 12.0 (H) 9.0 - 11.1 sec TROPONIN I Collection Time: 10/30/18  1:01 PM  
Result Value Ref Range Troponin-I, Qt. 0.65 (H) <0.05 ng/mL SAMPLES BEING HELD Collection Time: 10/30/18  1:01 PM  
Result Value Ref Range SAMPLES BEING HELD 1RED,1GREEN   
 COMMENT Add-on orders for these samples will be processed based on acceptable specimen integrity and analyte stability, which may vary by analyte. EKG, 12 LEAD, INITIAL Collection Time: 10/30/18  1:43 PM  
Result Value Ref Range Ventricular Rate 64 BPM  
 Atrial Rate 64 BPM  
 P-R Interval 182 ms QRS Duration 82 ms Q-T Interval 424 ms QTC Calculation (Bezet) 437 ms Calculated P Axis 51 degrees Calculated R Axis 23 degrees Calculated T Axis 40 degrees Diagnosis ** Poor data quality, interpretation may be adversely affected Normal sinus rhythm Low voltage QRS No previous ECGs available URINALYSIS W/ REFLEX CULTURE Collection Time: 10/30/18  2:17 PM  
Result Value Ref Range Color YELLOW/STRAW Appearance CLEAR CLEAR Specific gravity 1.020 1.003 - 1.030    
 pH (UA) 6.0 5.0 - 8.0 Protein 30 (A) NEG mg/dL Glucose NEGATIVE  NEG mg/dL Ketone NEGATIVE  NEG mg/dL Bilirubin NEGATIVE  NEG Blood NEGATIVE  NEG Urobilinogen 1.0 0.2 - 1.0 EU/dL Nitrites NEGATIVE  NEG  Leukocyte Esterase NEGATIVE  NEG    
 UA:UC IF INDICATED CULTURE NOT INDICATED BY UA RESULT CNI    
 WBC 0-4 0 - 4 /hpf  
 RBC 0-5 0 - 5 /hpf  
 Epithelial cells FEW FEW /lpf Bacteria NEGATIVE  NEG /hpf Hyaline cast 0-2 0 - 5 /lpf DRUG SCREEN, URINE Collection Time: 10/30/18  2:17 PM  
Result Value Ref Range AMPHETAMINES NEGATIVE  NEG    
 BARBITURATES NEGATIVE  NEG BENZODIAZEPINES NEGATIVE  NEG    
 COCAINE NEGATIVE  NEG METHADONE NEGATIVE  NEG    
 OPIATES NEGATIVE  NEG    
 PCP(PHENCYCLIDINE) NEGATIVE  NEG    
 THC (TH-CANNABINOL) NEGATIVE  NEG Drug screen comment (NOTE) TSH 3RD GENERATION Collection Time: 10/30/18  2:17 PM  
Result Value Ref Range TSH 0.27 (L) 0.36 - 3.74 uIU/mL Radiologic Studies -  
XR CHEST PORT Final Result CT HEAD WO CONT Final Result CTA HEAD NECK W CONT Final Result MRA NECK WO CONT    (Results Pending) MRA BRAIN WO CONT    (Results Pending) MRI BRAIN WO CONT    (Results Pending) DUPLEX LOWER EXT VENOUS BILAT    (Results Pending) CT Results  (Last 48 hours) 10/30/18 1229  CT HEAD WO CONT Final result Impression:  IMPRESSION:   
No acute intracranial process. Acute and subacute infarction right parietal and  
right occipital lobes. Suggestion of occlusion of M2 segment on the right. No  
significant superimposed hemorrhage or midline shift. The findings were called to the charge nurse at [facility] [address] on 10/30/2018 at 1:34 PM  
by Dr. Carl Slaughter. [facility] Narrative:  CLINICAL HISTORY: Vision loss INDICATION: Vision loss COMPARISON: None CT dose reduction was achieved through use of a standardized protocol tailored  
for this examination and automatic exposure control for dose modulation. TECHNIQUE: Serial axial images with a collimation of 5 mm were obtained from the  
skull base through the vertex FINDINGS:   
Subacute/acute infarction right parietal, right occipital lobes. . No significant  
superimposed hemorrhage. No midline shift. Likely M2 vessel occlusion, sylvian fissure. There is no evidence of midline shift or hydrocephalus. No extra-axial  
collections are seen. Mastoid air cells and the visualized paranasal sinuses are well pneumatized and  
clear. There is no evidence of depressed skull fractures of soft tissue  
swelling. 10/30/18 1229  CTA HEAD NECK W CONT Final result Impression:  Impression: Moderate acute right parieto-occipital infarction with thromboembolic occlusion  
of a tiny distal right posterior cerebral artery branch. No acute large vessel  
arterial occlusion. Questionable small focal cortical hypoattenuation in the  
left occipital lobe. 3 mm right posterior communicating artery probable infundibulum, less likely  
aneurysm Narrative:  CLINICAL HISTORY: Sudden onset vision loss EXAMINATION:  CT ANGIOGRAPHY HEAD AND NECK COMPARISON: CT head 10/30/2018 TECHNIQUE:  Following the uneventful administration of iodinated contrast  
material, axial CT angiography of the head and neck was performed. Delayed axial  
images through the head were also obtained. Coronal and sagittal reconstructions  
were obtained. Manual postprocessing of images was performed. 3-D  Sagittal  
maximal intensity projection images were obtained. 3-D Coronal maximal  
intensity projections were obtained. CT dose reduction was achieved through use  
of a standardized protocol tailored for this examination and automatic exposure  
control for dose modulation. FINDINGS:  
   
Delayed contrast-enhanced head CT: The ventricles are midline without hydrocephalus. There is no acute intra or  
extra-axial hemorrhage. Mild bilateral subcortical and periventricular areas of  
patchy low attenuation is nonspecific but likely related to changes of chronic  
small vessel ischemic disease. Moderate right parieto-occipital cortical and  
subcortical hypoattenuation likely representing acute ischemic injury. Probable  
small focal cortical hypoattenuation in the left occipital lobe as well. No  
evidence for hemorrhagic conversion. The basal cisterns are clear. The  
paranasal sinuses are clear. CTA NECK:  
   
Great vessels: Normal arch anatomy with the origins patent. Right subclavian artery: Patent Left subclavian artery: Patent Right common carotid artery: Patent Left common carotid artery: Patent Cervical right internal carotid artery: Patent with no significant stenosis by NASCET criteria. Cervical left internal carotid artery: Patent with mild proximal atherosclerosis  
but no significant stenosis by NASCET criteria. Right vertebral artery: Patent Left vertebral artery: Patent Reversal of the normal cervical lordosis with moderate cervical spondylosis Mild emphysema CTA HEAD:  
   
Right cavernous internal carotid artery: Patent Left cavernous internal carotid artery: Patent Anterior cerebral arteries: Patent Anterior communicating artery: Patent Right middle cerebral artery: Patent Left middle cerebral artery: Patent Posterior communicating arteries: Left is hypoplastic. Right is patent with 3 mm  
infundibulum versus aneurysm at the origin Posterior cerebral arteries: Moderate distal right-sided atherosclerosis with  
distal right PCA occlusion. The left is patent and unremarkable. Basilar artery: Patent Distal vertebral arteries: Patent CXR Results  (Last 48 hours) 10/30/18 1406  XR CHEST PORT Final result Impression:  IMPRESSION:  
No acute process. Narrative:  PORTABLE CHEST RADIOGRAPH/S: 10/30/2018 2:06 PM  
   
Clinical history: Blurred vision INDICATION:   Blurred vision, right occipital parietal infarction COMPARISON: None FINDINGS:  
AP portable upright view of the chest demonstrates stable  cardiopericardial  
 silhouette. The lungs are adequately expanded. There is no edema, effusion,  
consolidation, or pneumothorax. The osseous structures are unremarkable. Patient  
is on a cardiac monitor. Medical Decision Making I am the first provider for this patient. I reviewed the vital signs, available nursing notes, past medical history, past surgical history, family history and social history. Vital Signs-Reviewed the patient's vital signs. Patient Vitals for the past 12 hrs: 
 Temp Pulse Resp BP SpO2  
10/30/18 1530  (!) 56 22 173/79 94 % 10/30/18 1430  75 18 150/73 96 % 10/30/18 1400  77 20 163/65 94 % 10/30/18 1245  76 19 170/77 94 % 10/30/18 1156 97.4 °F (36.3 °C) 80 18 (!) 121/32 100 % EKG interpretation: (Preliminary) 13:43 Rhythm: normal sinus rhythm; and regular . Rate (approx.): 64; Axis: normal; NH interval: 182; QRS interval: low voltage; Other findings: poor data quality. Written by Carolann Arana ED Scribe, as dictated by Theodore Russo MD 
 
Records Reviewed: Nursing Notes and Old Medical Records Provider Notes (Medical Decision Making): DDx: Retinal artery occlusion, Hollenhorst plaque, CVA, vertebral artery dissection, arrhythmia, coronary artery syndrome, seizure, migraine Impression/plan: hx of HTN, tobacco use. Acute onset vision loss following chiropractic manipulation yesterday. CT greater than 24 hours post sxs shows acute embolic stroke. Not a candidate for tPA. Will admit to hospitalist with neurology and cardiology consult. ED Course:  
Initial assessment performed. The patients presenting problems have been discussed, and they are in agreement with the care plan formulated and outlined with them. I have encouraged them to ask questions as they arise throughout their visit. Consult Note: 
12:52 PM 
Theodore Russo MD spoke with Alberto Tovar MD 
Specialty: Neurology Discussed pt's hx, disposition, and available diagnostic and imaging results. Dr. Lorie Guevara will evaluate pt in ED and agrees with plan to admit. Consult Note: 
1:45 PM 
Barb Ng MD spoke with Kay Molina MD 
Specialty: Hospitalist 
Discussed pt's hx, disposition, and available diagnostic and imaging results. Dr. Curtis Wheeler will admit pt. Consult Note: 
2:37 PM 
Barb Ng MD spoke with Elana Fisher MD 
Specialty: Cardiology Discussed pt's hx, disposition, and available diagnostic and imaging results. Dr. Eitan Boo will see pt in consult.  
 
2:01 PM 
Dr. Lorie Guevara will be admitting pt. Critical Care Time:  
2:39 PM 
 
IMPENDING DETERIORATION -Cardiovascular, CNS, Metabolic and Renal 
ASSOCIATED RISK FACTORS - Dysrhythmia and CNS Decompensation MANAGEMENT- Bedside Assessment and Supervision of Care INTERPRETATION -  CT Scan and ECG INTERVENTIONS - Neurologic interventions  and Metobolic interventions CASE REVIEW - Hospitalist, Medical Sub-Specialist, Nursing and Family TREATMENT RESPONSE -Stable PERFORMED BY - Self NOTES   : 
I have spent 40 minutes of critical care time involved in lab review, consultations with specialist, family decision- making, bedside attention and documentation. During this entire length of time I was immediately available to the patient . Barb Ng MD 
 
Disposition: 
Admit Note: 
3:35 PM 
Pt is being admitted by Dr. Lorie Guevara. The results of their tests and reason(s) for their admission have been discussed with pt and/or available family. They convey agreement and understanding for the need to be admitted and for admission diagnosis. Diagnosis Clinical Impression: 1. Cerebrovascular accident (CVA) due to thrombosis of vertebral artery, unspecified blood vessel laterality ([address] 2. Vision loss of left eye 3. NSTEMI (non-ST elevated myocardial infarction) ([address] Attestations: This note is prepared by Dirk López, acting as a Scribe for MD Perico Moon MD: The scribe's documentation has been prepared under my direction and personally reviewed by me in its entirety. I confirm that the notes above accurately reflects all work, treatment, procedures, and medical decision making performed by me.

## 2018-10-30 NOTE — CONSULTS
[address] [facility]     Date of  Admission: 10/30/2018 12:07 PM     Admission type:Emergency    Consult for: elevated troponin and chest pain  Consult by: ED MD     Subjective:     Ector Cooper is a 79 y.o. male admitted for Stroke (cerebrum) ([facility] [address] TIA (transient ischemic attack). No previous cardiac hx, +HTN, DM, tob abuse. Admitted with sudden loss of vision in right eye. CT Scan acute and subacute infarction on right parietal and right occipital lobes. Does have c/o SOB, but no c/o CP. Cardiac risk factors: smoking/ tobacco exposure, dyslipidemia, diabetes mellitus, obesity, sedentary life style, male gender, hypertension. Patient Active Problem List    Diagnosis Date Noted    Stroke (cerebrum) ([facility] [address] 10/30/2018    TIA (transient ischemic attack) 10/30/2018    Elevated troponin 10/30/2018    Tobacco abuse 10/30/2018      Tawanda Page NP  Past Medical History:   Diagnosis Date    Tobacco abuse 10/30/2018      Social History     Socioeconomic History    Marital status: SINGLE     Spouse name: Not on file    Number of children: Not on file    Years of education: Not on file    Highest education level: Not on file   Social Needs    Financial resource strain: Not on file    Food insecurity - worry: Not on file    Food insecurity - inability: Not on file    Transportation needs - medical: Not on file   Spodly needs - non-medical: Not on file   Occupational History    Not on file   Tobacco Use    Smoking status: Heavy Tobacco Smoker     Packs/day: 2.00    Smokeless tobacco: Never Used   Substance and Sexual Activity    Alcohol use: No     Frequency: Never    Drug use: No    Sexual activity: Not on file   Other Topics Concern    Not on file   Social History Narrative    Not on file     No Known Allergies   History reviewed. No pertinent family history.    Current Facility-Administered Medications   Medication Dose Route Frequency    sodium chloride (NS) flush 5-10 mL  5-10 mL IntraVENous Q8H    sodium chloride (NS) flush 5-10 mL  5-10 mL IntraVENous PRN    sodium chloride (NS) flush 5-10 mL  5-10 mL IntraVENous Q8H    sodium chloride (NS) flush 5-10 mL  5-10 mL IntraVENous PRN    acetaminophen (TYLENOL) suppository 650 mg  650 mg Rectal Q4H PRN    [START ON 10/31/2018] clopidogrel (PLAVIX) tablet 75 mg  75 mg Oral DAILY    [START ON 10/31/2018] aspirin chewable tablet 81 mg  81 mg Oral DAILY    enoxaparin (LOVENOX) injection 40 mg  40 mg SubCUTAneous Q12H    0.9% sodium chloride infusion  75 mL/hr IntraVENous CONTINUOUS    sodium chloride (NS) flush 5-10 mL  5-10 mL IntraVENous Q8H    sodium chloride (NS) flush 5-10 mL  5-10 mL IntraVENous PRN    acetaminophen (TYLENOL) tablet 650 mg  650 mg Oral Q4H PRN    Or    acetaminophen (TYLENOL) solution 650 mg  650 mg Per NG tube Q4H PRN    Or    acetaminophen (TYLENOL) suppository 650 mg  650 mg Rectal Q4H PRN    bisacodyl (DULCOLAX) tablet 5 mg  5 mg Oral DAILY PRN    [START ON 10/31/2018] polyethylene glycol (MIRALAX) packet 17 g  17 g Oral DAILY    albuterol (PROVENTIL HFA, VENTOLIN HFA, PROAIR HFA) inhaler 2 Puff  2 Puff Inhalation Q4H PRN    . PHARMACY TO SUBSTITUTE PER PROTOCOL (Reordered from: rosuvastatin (CRESTOR) 10 mg tablet)    Per Protocol    [START ON 10/31/2018] umeclidinium-vilanterol (ANORO ELLIPTA) 62.5 mcg- 25 mcg/inhalation  1 Puff Inhalation DAILY    labetalol (NORMODYNE;TRANDATE) injection 5 mg  5 mg IntraVENous Q10MIN PRN    niCARdipine in Saline (CARDENE) 0.1mg/mL 200 ml premix infusion  5-15 mg/hr IntraVENous TITRATE    [START ON 10/31/2018] nicotine (NICODERM CQ) 21 mg/24 hr patch 1 Patch  1 Patch TransDERmal DAILY    glucose chewable tablet 16 g  4 Tab Oral PRN    dextrose (D50W) injection syrg 12.5-25 g  25-50 mL IntraVENous PRN    glucagon (GLUCAGEN) injection 1 mg  1 mg IntraMUSCular PRN    insulin lispro (HUMALOG) injection   SubCUTAneous AC&HS     Current Outpatient Medications   Medication Sig    clindamycin (CLEOCIN) 300 mg capsule Take 300 mg by mouth three (3) times daily.  lisinopril (PRINIVIL, ZESTRIL) 5 mg tablet Take 5 mg by mouth daily.  metFORMIN (GLUCOPHAGE) 1,000 mg tablet Take 1,000 mg by mouth two (2) times daily (with meals).  rosuvastatin (CRESTOR) 10 mg tablet Take 10 mg by mouth nightly.  umeclidinium-vilanterol (ANORO ELLIPTA) 62.5-25 mcg/actuation inhaler Take 1 Puff by inhalation daily.  albuterol (VENTOLIN HFA) 90 mcg/actuation inhaler Take 2 Puffs by inhalation every four (4) hours as needed for Wheezing. Review of Symptoms:   11 systems reviewed, negative other than as stated in the HPI        Objective:      Visit Vitals  /79   Pulse (!) 56   Temp 97.4 °F (36.3 °C)   Resp 22   Ht 6' 3\" (1.905 m)   Wt (!) 160.1 kg (353 lb)   SpO2 94%   BMI 44.12 kg/m²       Physical:   General: morbidly obese  male in no acute distress  Heart: RRR, no m/S3/JVD, no carotid bruits   Lungs: wheezing thruout  Abdomen: Soft, +BS, NTND   Extremities: shaneka LE edema +2, chronic venous stasis  Neurologic: Grossly normal    Data Review:   Recent Labs     10/30/18  1301   WBC 15.7*   HGB 12.7   HCT 39.2        Recent Labs     10/30/18  1301   *   K 4.2      CO2 28   *   BUN 19   CREA 1.01   CA 8.2*   ALB 3.6   TBILI 0.4   SGOT 21   ALT 31   INR 1.2*       Recent Labs     10/30/18  1301   TROIQ 0.65*         Intake/Output Summary (Last 24 hours) at 10/30/2018 1603  Last data filed at 10/30/2018 1456  Gross per 24 hour   Intake    Output 400 ml   Net -400 ml        Cardiographics    Telemetry: SR with one episode of wide complexed SVT vs afib with abgerancy  ECG:  SR   Echocardiogram: SHERIDAN in AM    CTA heads:  Acute and subacute infarction right parietal and right occipital lobes.  Suggestion of occlusion of M2 segment on the right. No  significant superimposed hemorrhage or midline shift. Assessment:       Active Problems:    Stroke (cerebrum) ([address] (10/30/2018)      TIA (transient ischemic attack) (10/30/2018)      Elevated troponin (10/30/2018)      Tobacco abuse (10/30/2018)         Plan:     Elevated troponin:  Follow trend of troponin's. Likely r/t CVA vs ACS, but will follow. Continue on ASA, statin. No BB at this time, keep BP > 150 with new CVA  Nuclear stress lexiscan in future. SHERIDAN in AM to evaluate for possible thrombus. May have arrhythmias with 6 beats of irregular wide complex tachy noted. Keep NPO. Sleep apnea? ? Plan for outpt evaluation. Thank you for consulting [facility]    Lupe Snow NP     Patient seen and examined by me with nurse practitioner Ellis Pierce. I personally performed all components of the history, physical, and medical decision making and agree with the assessment and plan with minor modifications as noted. Embolic appearing CVApursue SHERIDAN to rule out cardiac source. Also possible is atheroembolic or carotid dissection as this seems to have occurred after neck manipulation by a chiropractor.
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
## 2018-10-30 NOTE — PROGRESS NOTES
Pharmacy Clarification of the Prior to Admission Medication Regimen Retrospective to the Admission Medication Reconciliation The patient was interviewed regarding clarification of the prior to admission medication regimen and was questioned regarding use of any other inhalers, topical products, over the counter medications, herbal medications, vitamin products or ophthalmic/nasal/otic medication use. Information Obtained From: Patient, Monae Cameron Recommendations/Findings: The following amendments were made to the [facility] [address] [facility]:  
 
1) Additions:  
? All 6 medications below 2) Removals:  
? norco 
 
3) Changes: NONE 4) Pertinent Pharmacy Findings: 
? Updated patients preferred outpatient pharmacy to: [facility] [facility] ? clindamycin (CLEOCIN) 300 mg capsule: Patient started a 10 day regimen on 10/21/18. Patient has completed 9 days of therapy as of 10. Antibiotic Indication: feet swelling ? Patient stated he 'sometimes forgets' to take his abx and other medications. PTA medication list was corrected to the following:  
 
Prior to Admission Medications Prescriptions Last Dose Informant Patient Reported? Taking? albuterol (VENTOLIN HFA) 90 mcg/actuation inhaler 10/29/2018 at Unknown time Self Yes Yes Sig: Take 2 Puffs by inhalation every four (4) hours as needed for Wheezing. clindamycin (CLEOCIN) 300 mg capsule 10/29/2018 at Unknown time Self Yes Yes Sig: Take 300 mg by mouth three (3) times daily. lisinopril (PRINIVIL, ZESTRIL) 5 mg tablet 10/29/2018 at Unknown time Self Yes Yes Sig: Take 5 mg by mouth daily. metFORMIN (GLUCOPHAGE) 1,000 mg tablet 10/29/2018 at Unknown time Self Yes Yes Sig: Take 1,000 mg by mouth two (2) times daily (with meals). rosuvastatin (CRESTOR) 10 mg tablet 10/29/2018 at Unknown time Self Yes Yes Sig: Take 10 mg by mouth nightly. umeclidinium-vilanterol (ANORO ELLIPTA) 62.5-25 mcg/actuation inhaler 10/29/2018 at Unknown time Self Yes Yes Sig: Take 1 Puff by inhalation daily. Facility-Administered Medications: None Thank you, 
Chano Aguila CPhT Medication History Pharmacy Technician

## 2018-10-31 PROBLEM — E11.9 DIABETES MELLITUS TYPE 2, CONTROLLED (HCC): Status: ACTIVE | Noted: 2018-10-31

## 2018-10-31 PROBLEM — I48.0 PAF (PAROXYSMAL ATRIAL FIBRILLATION) (HCC): Status: ACTIVE | Noted: 2018-10-31

## 2018-10-31 LAB
ALBUMIN SERPL-MCNC: 3.5 G/DL (ref 3.5–5)
ALBUMIN/GLOB SERPL: 0.9 {RATIO} (ref 1.1–2.2)
ALP SERPL-CCNC: 88 U/L (ref 45–117)
ALT SERPL-CCNC: 32 U/L (ref 12–78)
ANION GAP SERPL CALC-SCNC: 4 MMOL/L (ref 5–15)
AST SERPL-CCNC: 25 U/L (ref 15–37)
ATRIAL RATE: 64 BPM
BILIRUB SERPL-MCNC: 0.5 MG/DL (ref 0.2–1)
BUN SERPL-MCNC: 16 MG/DL (ref 6–20)
BUN/CREAT SERPL: 18 (ref 12–20)
CALCIUM SERPL-MCNC: 8.3 MG/DL (ref 8.5–10.1)
CALCULATED P AXIS, ECG09: 51 DEGREES
CALCULATED R AXIS, ECG10: 23 DEGREES
CALCULATED T AXIS, ECG11: 40 DEGREES
CHLORIDE SERPL-SCNC: 101 MMOL/L (ref 97–108)
CHOLEST SERPL-MCNC: 151 MG/DL
CO2 SERPL-SCNC: 29 MMOL/L (ref 21–32)
CREAT SERPL-MCNC: 0.9 MG/DL (ref 0.7–1.3)
DIAGNOSIS, 93000: NORMAL
ERYTHROCYTE [DISTWIDTH] IN BLOOD BY AUTOMATED COUNT: 13.8 % (ref 11.5–14.5)
EST. AVERAGE GLUCOSE BLD GHB EST-MCNC: 220 MG/DL
GLOBULIN SER CALC-MCNC: 3.8 G/DL (ref 2–4)
GLUCOSE BLD STRIP.AUTO-MCNC: 138 MG/DL (ref 65–100)
GLUCOSE BLD STRIP.AUTO-MCNC: 157 MG/DL (ref 65–100)
GLUCOSE BLD STRIP.AUTO-MCNC: 182 MG/DL (ref 65–100)
GLUCOSE BLD STRIP.AUTO-MCNC: 218 MG/DL (ref 65–100)
GLUCOSE SERPL-MCNC: 176 MG/DL (ref 65–100)
HBA1C MFR BLD: 9.3 % (ref 4.2–6.3)
HCT VFR BLD AUTO: 38.6 % (ref 36.6–50.3)
HDLC SERPL-MCNC: 28 MG/DL
HDLC SERPL: 5.4 {RATIO} (ref 0–5)
HGB BLD-MCNC: 12.7 G/DL (ref 12.1–17)
LDLC SERPL CALC-MCNC: 78.4 MG/DL (ref 0–100)
LIPID PROFILE,FLP: ABNORMAL
MCH RBC QN AUTO: 27.8 PG (ref 26–34)
MCHC RBC AUTO-ENTMCNC: 32.9 G/DL (ref 30–36.5)
MCV RBC AUTO: 84.5 FL (ref 80–99)
NRBC # BLD: 0 K/UL (ref 0–0.01)
NRBC BLD-RTO: 0 PER 100 WBC
P-R INTERVAL, ECG05: 182 MS
PLATELET # BLD AUTO: 186 K/UL (ref 150–400)
PMV BLD AUTO: 11.4 FL (ref 8.9–12.9)
POTASSIUM SERPL-SCNC: 4.2 MMOL/L (ref 3.5–5.1)
PROT SERPL-MCNC: 7.3 G/DL (ref 6.4–8.2)
Q-T INTERVAL, ECG07: 424 MS
QRS DURATION, ECG06: 82 MS
QTC CALCULATION (BEZET), ECG08: 437 MS
RBC # BLD AUTO: 4.57 M/UL (ref 4.1–5.7)
SERVICE CMNT-IMP: ABNORMAL
SODIUM SERPL-SCNC: 134 MMOL/L (ref 136–145)
TRIGL SERPL-MCNC: 223 MG/DL (ref ?–150)
TROPONIN I SERPL-MCNC: 1.17 NG/ML
TROPONIN I SERPL-MCNC: 1.48 NG/ML
VENTRICULAR RATE, ECG03: 64 BPM
VLDLC SERPL CALC-MCNC: 44.6 MG/DL
WBC # BLD AUTO: 16.9 K/UL (ref 4.1–11.1)

## 2018-10-31 PROCEDURE — 92523 SPEECH SOUND LANG COMPREHEN: CPT

## 2018-10-31 PROCEDURE — 84484 ASSAY OF TROPONIN QUANT: CPT | Performed by: INTERNAL MEDICINE

## 2018-10-31 PROCEDURE — C8929 TTE W OR WO FOL WCON,DOPPLER: HCPCS

## 2018-10-31 PROCEDURE — 74011250637 HC RX REV CODE- 250/637: Performed by: INTERNAL MEDICINE

## 2018-10-31 PROCEDURE — 80053 COMPREHEN METABOLIC PANEL: CPT | Performed by: INTERNAL MEDICINE

## 2018-10-31 PROCEDURE — 65660000000 HC RM CCU STEPDOWN

## 2018-10-31 PROCEDURE — 83036 HEMOGLOBIN GLYCOSYLATED A1C: CPT | Performed by: PSYCHIATRY & NEUROLOGY

## 2018-10-31 PROCEDURE — 82962 GLUCOSE BLOOD TEST: CPT

## 2018-10-31 PROCEDURE — 36415 COLL VENOUS BLD VENIPUNCTURE: CPT | Performed by: PSYCHIATRY & NEUROLOGY

## 2018-10-31 PROCEDURE — 74011636637 HC RX REV CODE- 636/637: Performed by: INTERNAL MEDICINE

## 2018-10-31 PROCEDURE — 74011250636 HC RX REV CODE- 250/636

## 2018-10-31 PROCEDURE — 74011250637 HC RX REV CODE- 250/637: Performed by: PSYCHIATRY & NEUROLOGY

## 2018-10-31 PROCEDURE — 97530 THERAPEUTIC ACTIVITIES: CPT

## 2018-10-31 PROCEDURE — G8978 MOBILITY CURRENT STATUS: HCPCS

## 2018-10-31 PROCEDURE — 74011250637 HC RX REV CODE- 250/637: Performed by: NURSE PRACTITIONER

## 2018-10-31 PROCEDURE — 97161 PT EVAL LOW COMPLEX 20 MIN: CPT

## 2018-10-31 PROCEDURE — 85027 COMPLETE CBC AUTOMATED: CPT | Performed by: INTERNAL MEDICINE

## 2018-10-31 PROCEDURE — G8979 MOBILITY GOAL STATUS: HCPCS

## 2018-10-31 PROCEDURE — 77010033678 HC OXYGEN DAILY

## 2018-10-31 PROCEDURE — 94760 N-INVAS EAR/PLS OXIMETRY 1: CPT

## 2018-10-31 PROCEDURE — 80061 LIPID PANEL: CPT | Performed by: PSYCHIATRY & NEUROLOGY

## 2018-10-31 RX ORDER — BISACODYL 5 MG
10 TABLET, DELAYED RELEASE (ENTERIC COATED) ORAL
Status: COMPLETED | OUTPATIENT
Start: 2018-10-31 | End: 2018-10-31

## 2018-10-31 RX ADMIN — Medication 10 ML: at 01:26

## 2018-10-31 RX ADMIN — ALBUTEROL SULFATE 2 PUFF: 90 AEROSOL, METERED RESPIRATORY (INHALATION) at 01:55

## 2018-10-31 RX ADMIN — Medication 10 ML: at 22:25

## 2018-10-31 RX ADMIN — CLOPIDOGREL BISULFATE 75 MG: 75 TABLET ORAL at 09:12

## 2018-10-31 RX ADMIN — PRAVASTATIN SODIUM 80 MG: 40 TABLET ORAL at 01:52

## 2018-10-31 RX ADMIN — PRAVASTATIN SODIUM 80 MG: 40 TABLET ORAL at 22:23

## 2018-10-31 RX ADMIN — BISACODYL 10 MG: 5 TABLET, COATED ORAL at 11:50

## 2018-10-31 RX ADMIN — INSULIN LISPRO 3 UNITS: 100 INJECTION, SOLUTION INTRAVENOUS; SUBCUTANEOUS at 11:50

## 2018-10-31 RX ADMIN — UMECLIDINIUM BROMIDE AND VILANTEROL TRIFENATATE 1 PUFF: 62.5; 25 POWDER RESPIRATORY (INHALATION) at 09:12

## 2018-10-31 RX ADMIN — APIXABAN 5 MG: 5 TABLET, FILM COATED ORAL at 18:25

## 2018-10-31 RX ADMIN — PERFLUTREN 2 ML: 6.52 INJECTION, SUSPENSION INTRAVENOUS at 15:40

## 2018-10-31 RX ADMIN — Medication 10 ML: at 22:23

## 2018-10-31 RX ADMIN — INSULIN LISPRO 3 UNITS: 100 INJECTION, SOLUTION INTRAVENOUS; SUBCUTANEOUS at 09:04

## 2018-10-31 RX ADMIN — Medication 10 ML: at 18:25

## 2018-10-31 RX ADMIN — INSULIN LISPRO 2 UNITS: 100 INJECTION, SOLUTION INTRAVENOUS; SUBCUTANEOUS at 22:23

## 2018-10-31 RX ADMIN — ASPIRIN 81 MG CHEWABLE TABLET 81 MG: 81 TABLET CHEWABLE at 09:03

## 2018-10-31 NOTE — PROGRESS NOTES
[address] [place] [address]  [phone] Cardiology Progress Note 10/31/2018 11:00AM 
 
Admit Date: 10/30/2018 Admit Diagnosis:  
Stroke (cerebrum) ([address] TIA (transient ischemic attack) Subjective:  
 
Tex Titus has no complaints. Overnight telemetry shows burst of afib with RVR. Now, remains in SR. Visit Vitals /77 Pulse 66 Temp 97.4 °F (36.3 °C) Resp 16 Ht 6' 3\" (1.905 m) Wt (!) 160.1 kg (353 lb) SpO2 94% BMI 44.12 kg/m² Current Facility-Administered Medications Medication Dose Route Frequency  perflutren lipid microspheres (DEFINITY) 1.1 mg/mL contrast injection  apixaban (ELIQUIS) tablet 5 mg  5 mg Oral Q12H  
 sodium chloride (NS) flush 5-10 mL  5-10 mL IntraVENous Q8H  
 sodium chloride (NS) flush 5-10 mL  5-10 mL IntraVENous PRN  
 sodium chloride (NS) flush 5-10 mL  5-10 mL IntraVENous Q8H  
 sodium chloride (NS) flush 5-10 mL  5-10 mL IntraVENous PRN  
 acetaminophen (TYLENOL) suppository 650 mg  650 mg Rectal Q4H PRN  
 clopidogrel (PLAVIX) tablet 75 mg  75 mg Oral DAILY  sodium chloride (NS) flush 5-10 mL  5-10 mL IntraVENous Q8H  
 sodium chloride (NS) flush 5-10 mL  5-10 mL IntraVENous PRN  
 acetaminophen (TYLENOL) tablet 650 mg  650 mg Oral Q4H PRN Or  
 acetaminophen (TYLENOL) solution 650 mg  650 mg Per NG tube Q4H PRN Or  
 acetaminophen (TYLENOL) suppository 650 mg  650 mg Rectal Q4H PRN  
 bisacodyl (DULCOLAX) tablet 5 mg  5 mg Oral DAILY PRN  polyethylene glycol (MIRALAX) packet 17 g  17 g Oral DAILY  albuterol (PROVENTIL HFA, VENTOLIN HFA, PROAIR HFA) inhaler 2 Puff  2 Puff Inhalation Q4H PRN  
 umeclidinium-vilanterol (ANORO ELLIPTA) 62.5 mcg- 25 mcg/inhalation  1 Puff Inhalation DAILY  labetalol (NORMODYNE;TRANDATE) injection 5 mg  5 mg IntraVENous Q10MIN PRN  
 nicotine (NICODERM CQ) 21 mg/24 hr patch 1 Patch  1 Patch TransDERmal DAILY  glucose chewable tablet 16 g  4 Tab Oral PRN  
 dextrose (D50W) injection syrg 12.5-25 g  25-50 mL IntraVENous PRN  
 glucagon (GLUCAGEN) injection 1 mg  1 mg IntraMUSCular PRN  
 insulin lispro (HUMALOG) injection   SubCUTAneous AC&HS  pravastatin (PRAVACHOL) tablet 80 mg  80 mg Oral QHS Current Outpatient Medications Medication Sig  
 clindamycin (CLEOCIN) 300 mg capsule Take 300 mg by mouth three (3) times daily.  lisinopril (PRINIVIL, ZESTRIL) 5 mg tablet Take 5 mg by mouth daily.  metFORMIN (GLUCOPHAGE) 1,000 mg tablet Take 1,000 mg by mouth two (2) times daily (with meals).  rosuvastatin (CRESTOR) 10 mg tablet Take 10 mg by mouth nightly.  umeclidinium-vilanterol (ANORO ELLIPTA) 62.5-25 mcg/actuation inhaler Take 1 Puff by inhalation daily.  albuterol (VENTOLIN HFA) 90 mcg/actuation inhaler Take 2 Puffs by inhalation every four (4) hours as needed for Wheezing. Objective:  
  
Physical Exam: 
General Appearance:  morbidly obese  male in no acute distress Chest:   wheezing Cardiovascular:  Regular rate and rhythm, no murmur.  
Abdomen:   Soft, non-tender, bowel sounds are active.  
Extremities: chronic venous stasis, +1-2 peripheral edema Skin:  Warm and dry.  
 
Data Review:  
Recent Labs 10/31/18 
0159 10/30/18 
1301 WBC 16.9* 15.7* HGB 12.7 12.7 HCT 38.6 39.2  186 Recent Labs 10/31/18 
0159 10/30/18 
1301 * 134* K 4.2 4.2  101 CO2 29 28 * 164* BUN 16 19 CREA 0.90 1.01  
CA 8.3* 8.2* ALB 3.5 3.6 TBILI 0.5 0.4 SGOT 25 21 ALT 32 31 INR  --  1.2* Recent Labs 10/31/18 
3626 10/31/18 
0159 10/30/18 
1301 TROIQ 1.17* 1.48* 0.65* Intake/Output Summary (Last 24 hours) at 10/31/2018 1447 Last data filed at 10/31/2018 3907 Gross per 24 hour Intake  Output 1125 ml Net -1125 ml Telemetry: SR and 1 burst of afib with RVR Assessment:  
 
Principal Problem: Stroke (cerebrum) ([facility] (10/30/2018) Active Problems: 
  TIA (transient ischemic attack) (10/30/2018) Elevated troponin (10/30/2018) Tobacco abuse (10/30/2018) PAF (paroxysmal atrial fibrillation) ([facility] (10/31/2018) Diabetes mellitus type 2, controlled ([facility] (10/31/2018) Plan:  
 
Elevated troponin:  
Mild elevation likely r/t CVA Continues on statin. ASA discontinued to decrease the risk of bleeding, with the initiation of DOAC Continue Plavix for now, but will discuss with neurology if this is necessary in the long run No BB at this time, keep BP > 150 with new CVA Nuclear stress Silvana Mathis in future. The patient has multiple risk factors. PAF: 
Short run on telemetry overnight 10/31/2018suspect as cause for CVA. Starting Eliquis. No need for SHERIDAN as it is suspected that embolic CVA r/t NELY thrombus- even if no thrombus seen on SHERIDAN, it could be due to completemigration No rate controlling medication at this time. Prefer BB but BP borderline for CVA Sleep apnea? ? Plan for outpt evaluation.  
  
CVA: 
Continue on eliquis, statin- ? If plavix needed? Discuss with neuro MRI pending, may move/transfer to [facility] as [facility] MRI too small for patient. Thank you for consulting LUIS Payne NP  
  
Patient seen and examined by me with nurse practitioner Taryn Lang. I personally performed all components of the history, physical, and medical decision making and agree with the assessment and plan with minor modifications as noted. Short run of PAF notedsuspect this is the cause of CVA. Starting Eliquis. Discuss with neurology if Plavix still needed as well.

## 2018-10-31 NOTE — ED NOTES
Hospitalist Abby Martinez made aware of MRI not accepting pt. Abby Martinez was not made aware of this. Anne Beckham

## 2018-10-31 NOTE — ED NOTES
Verbal shift change report given to [facility], RN (oncoming nurse) by Ismael Godfrey RN (offgoing nurse). Report included the following information SBAR, Kardex, MAR and Recent Results.

## 2018-10-31 NOTE — PROGRESS NOTES
Spiritual Care Assessment/Progress Note [address] 
 
 
NAME: Joanie Child      MRN: [id number] AGE: 79 y.o. SEX: male Oriental orthodox Affiliation: Religious  
Language: English  
 
10/31/2018     Total Time (in minutes): 10 Spiritual Assessment begun in [facility] EMERGENCY DEPT through conversation with: 
  
    []Patient        [] Family    [] Friend(s) Reason for Consult: Advance medical directive consult Spiritual beliefs: (Please include comment if needed) 
   [] Identifies with a annalise tradition:     
   [] Supported by a annalise community:        
   [] Claims no spiritual orientation:       
   [] Seeking spiritual identity:            
   [] Adheres to an individual form of spirituality:       
   [x] Not able to assess:                   
 
    
Identified resources for coping:  
   [] Prayer                           
   [] Music                  [] Guided Imagery 
   [] Family/friends                 [] Pet visits [] Devotional reading                         [x] Unknown 
   [] Other:                                        
 
 
Interventions offered during this visit: (See comments for more details) Patient Interventions: Advance medical directive consult Plan of Care: 
 
 [] Support spiritual and/or cultural needs [x] Support AMD and/or advance care planning process    
 [] Support grieving process 
 [] Coordinate Rites and/or Rituals  
 [] Coordination with community clergy [] No spiritual needs identified at this time 
 [] Detailed Plan of Care below (See Comments)  [] Make referral to Music Therapy 
[] Make referral to Pet Therapy    
[] Make referral to Addiction services 
[] Make referral to [facility] 
[] Make referral to Spiritual Care Partner 
[] No future visits requested       
[x] Follow up visits as needed Comments: Received In Basket request for Advance Medical Directive consult. Review of chart notes show that at the present time, patient is a poor historian, did not state the year correctly and is not appropriate to complete Advance Medical Directive at this time. Chaplains will follow patient if/when he is admitted to hospital.  
Visit was not made to ER as pastoral care staff was triaging a large number of Advance Medical Directive requests. Piotr Barahona MPS, [facility]  Paging Service  [phone])

## 2018-10-31 NOTE — PROGRESS NOTES
TRANSFER - IN REPORT: 
 
Verbal report received from SAMMY Pierre(name) on Llamas Eis  being received from ED(unit) for routine progression of care Report consisted of patients Situation, Background, Assessment and  
Recommendations(SBAR). Information from the following report(s) SBAR, Intake/Output, MAR, Accordion and Recent Results was reviewed with the receiving nurse. Opportunity for questions and clarification was provided. Assessment completed upon patients arrival to unit and care assumed.

## 2018-10-31 NOTE — ED NOTES
Case d/w Cardiology, pt does not need to be NPO. He is allowed to eat due to SHERIDAN no longer being done.

## 2018-10-31 NOTE — ED NOTES
Bedside and Verbal shift change report given to Chelsy Lozano RN (oncoming nurse) by Renetta Guevara RN (offgoing nurse). Report included the following information SBAR, Kardex, ED Summary, MAR and Trav.

## 2018-10-31 NOTE — PROGRESS NOTES
Problem: Neurolinguistics Impaired (Adult) Goal: *Acute Goals and Plan of Care (Insert Text) Speech pathology goals Initiated 10/31/2018 1. Patient will be oriented x4 given environmental cues within 7 days 2. Patient will complete simple calculations with 80% accuracy given no cues within 7 days 3. Patient will complete simple verbal reasoning tasks with 80% accuracy given no cues within 7 days 4. Patient will answer complex yes/no questions with 80% accuracy given no cues within 7 days 5. Patient will complete simple divergent naming tasks (g=10) with 80% accuracy given no cues within 7 days Speech LAnguage Pathology evaluation Patient: Charmayne Lolling (72 y.o. male) Date: 10/31/2018 Primary Diagnosis: Stroke (cerebrum) ([facility] [address] TIA (transient ischemic attack) Precautions:     
 
ASSESSMENT : 
Based on the objective data described below, the patient presents with moderate integrated language impairment characterized by decreased orientation, calculation, verbal reasoning, auditory processing, and verbal organization. CT showed, \"Acute and subacute infarction right parietal and right occipital lobes. \" At baseline, patient lives alone in a motel, however he reported he was planning to move into an adult community on Friday. Patient manages all ADLs and IADLs at baseline, including driving, cooking, cleaning, medication, and finances (using checkbook, calculator, and mental math). 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 Patient completed 11th grade. However, patient is a poor historian and no family available to confirm. Do not recommend patient drive, manage medication, or manage finances. Recommend 24 hour supervision upon discharge. Recommend intensive SLP treatment in IP rehab to improve communication of medical, safety, and social wants/needs in a safe and timely manner. Unable to complete swallowing evaluation this date as patient NPO for testing. Patient will benefit from skilled intervention to address the above impairments. Patients rehabilitation potential is considered to be Good Factors which may influence rehabilitation potential include:  
[]              None noted [x]              Mental ability/status [x]              Medical condition []              Home/family situation and support systems []              Safety awareness []              Pain tolerance/management 
[]              Other: PLAN : 
Recommendations and Planned Interventions: 
--SLP treatment to improve communication of medical, safety, and social wants/needs in a safe and timely manner Frequency/Duration: Patient will be followed by speech-language pathology 3 times a week to address goals. Discharge Recommendations: Inpatient Rehab SUBJECTIVE:  
Patient stated [phone], or [phone] when asked the year. OBJECTIVE:  
 
Past Medical History:  
Diagnosis Date  Tobacco abuse 10/30/2018 History reviewed. No pertinent surgical history. Prior Level of Function/Home Situation:  
Home Situation Home Environment: Apartment # Steps to Enter: 0(to PT pt reports no steps to enter) One/Two Story Residence: One story Living Alone: Yes(pt reports to PT he does lives alone) Support Systems: (pt states to PT that he is alone, no help) Patient Expects to be Discharged to[de-identified] [facility] Current DME Used/Available at Home: NoneMental Status: 
Neurologic State: Alert Orientation Level: Oriented to person, Oriented to place, Oriented to situation Cognition: Follows commands Language Comprehension and Expression: Auditory Comprehension Hearing Aid: None Neuro-Linguistics: 
  
Patient completed portions of the Cognistat with the following results: 
Orientation: 6/12, moderately impaired. Attention: 7/8 for 3-6 digit repetition, [facility] Verbal recall: 10/12; recalled 3/4 words after 5 minutes given no cues, increasing to 4/4 given field of 3 choices. Required 3 trials to repeat the words immediately; [facility] Calculation: 2/4, mildly impaired Verbal reasonin/8 for similarities, severely impaired Verbal problem solvin/6 for solutions, [facility] Auditory processing: 3/5 for complex yes/no questions Verbal organization: named 8 items in divergent naming task G Codes: In compliance with CMSs Claims Based Outcome Reporting, the following G-code set was chosen for this patient based the use of the NOMS functional outcome to quantify this patient's level of memory impairment. Using the NOMS, the patient was determined to be at level 5 for memory which correlates with the CJ= 20-39% level of severity. Based on the objective assessment provided within this note, the current, goal, and discharge g-codes are as follows: 
 
Memory   Memory Current Status CJ= 20-39%  Memory Goal Status CI= 1-19% NOMS:  
The NOMS functional outcome measure was used to quantify this patient's level of memory impairment. Based on the NOMS, the patient was determined to be at level 5 for memory function. NOMS Memory: 
Level 1 (CN): Unable to recall any information regardless of cues Level 2 (CM): Constant max cues or external aids to recall personal info Level 3 (CL): Max cues or use external aids for simple routine and personal info. Level 4 (CK): Min cues to recall/use aids for simple info. Max cues to recall/ use aids for complex/novel info and plan/follow through on simple future events Level 5 (CJ): Min cues to recall/use aids for complex/novel info and to plan/follow through complex future events Level 6 (CI): Recalls or uses aids/strategies for complex info & planning future events. Min cues for breakdowns Level 7 (CH): Independent with recall/use of aids/strategies. DUYEN. (2003). National Outcomes Measurement System (NOMS): Adult Speech-Language Pathology User's Guide. Pain: 
Pain Scale 1: Numeric (0 - 10) Pain Intensity 1: 0 After treatment:  
[]              Patient left in no apparent distress sitting up in chair 
[x]              Patient left in no apparent distress in bed 
[x]              Call bell left within reach [x]              Nursing notified 
[]              Caregiver present 
[]              Bed alarm activated COMMUNICATION/EDUCATION:  
The patients plan of care including recommendations and planned interventions was discussed with: Registered Nurse. [x]  Patient/family have participated as able in goal setting and plan of care. [x]  Patient/family agree to work toward stated goals and plan of care. []  Patient understands intent and goals of therapy, but is neutral about his/her participation. []  Patient is unable to participate in goal setting and plan of care. Thank you for this referral. 
ONDINA El Time Calculation: 20 mins

## 2018-10-31 NOTE — ED NOTES
Verbal shift change report given to Penelope YOUNG RN (oncoming nurse) by Morena Charles RN (offgoing nurse). Report included the following information SBAR, Kardex, Intake/Output, MAR and Recent Results.

## 2018-10-31 NOTE — PROGRESS NOTES
Scheduled MRI for 1200pm tomorrow, November 1st at [facility].

## 2018-10-31 NOTE — PROGRESS NOTES
Bedside shift change report given to Christian Cruz RN (oncoming nurse) by Jordan Perez RN (offgoing nurse). Report included the following information SBAR, Intake/Output, MAR, Accordion and Recent Results. Zone Phone:   [phone] Significant changes during shift:  Admitted Patient Information [place] Began 
79 y.o. 
10/30/2018 12:07 PM by Nani Young MD. [place] Began was admitted from Home 
 
Problem List 
 
Patient Active Problem List  
 Diagnosis Date Noted  PAF (paroxysmal atrial fibrillation) ([facility] [address] 10/31/2018  Diabetes mellitus type 2, controlled ([facility] [address] 10/31/2018  Stroke (cerebrum) ([facility] [address] 10/30/2018  TIA (transient ischemic attack) 10/30/2018  Elevated troponin 10/30/2018  Tobacco abuse 10/30/2018 Past Medical History:  
Diagnosis Date  Diabetes mellitus type 2, controlled ([facility] [address] 10/31/2018  PAF (paroxysmal atrial fibrillation) ([facility] [address] 10/31/2018  Tobacco abuse 10/30/2018 Core Measures: CVA: Yes Yes Activity Status: OOB to Chair No 
Ambulated this shift No  
Bed Rest Yes Supplemental O2: (If Applicable) NC Yes On 2 Liters/min DVT prophylaxis: DVT prophylaxis Med- Yes DVT prophylaxis SCD or AIDAN- No  
 
Patient Safety: 
 
Falls Score Total Score: 4 Safety Level_______ Bed Alarm On? No 
Sitter? No 
 
Plan for upcoming shift: Monitor, MRI tomorrow Discharge Plan: No TBD Active Consults: 
IP CONSULT TO NEUROLOGY 
IP CONSULT TO NEUROLOGY 
IP CONSULT TO CARDIOLOGY

## 2018-10-31 NOTE — ED NOTES
Pt is alert and oriented to situation, place,and self. Stated the year is [phone]. Stated he was leaving the doctor office and went to get into car but lost vision in both eyes. Pt does smoke. Not on 02 at home. Denies COPD or asthma. Is on inhalers however. Pt is poor historian.

## 2018-10-31 NOTE — PROGRESS NOTES
Hospitalist Progress Note NAME: Farhad Luu :  1950 MRN:  [id number] Assessment / Plan: CVA:  vision loss, CT shows Right parietal and occipital subacute vs acute CVA, due for MRI and ECHO, c/w Eliquis, Neurology eval requested. HTN: hold ACE, use labetalol prn allowing permissive HTN. Increased Troponin: c/w Eliquis, Cardiology in the case, due for ECHO by definition NSTEMI. DM type 2 with hyperglycemia: hold metformin for now, c/w SSI, HbA1C 9.3 Leukocytosis ua/xrays ngt: no clear signs of infection, U/A and CXR are negative, may be reactive, no signs of cellulitis Tobacco: counseled, c/w nicotine patch Morbid  obesity nutrition referral  BMI 44.12 Asthma/COPD - resume meds Surrogate Decision Maker: Jessica Tinoco [phone] Baseline: ambulatory Code status: Full Prophylaxis: Eliquis Recommended Disposition: SAH/Rehab Subjective: Chief Complaint / Reason for Physician Visit \"My vision is slightly better\". Discussed with RN events overnight. Review of Systems: 
Symptom Y/N Comments  Symptom Y/N Comments Fever/Chills    Chest Pain Poor Appetite    Edema Cough    Abdominal Pain Sputum    Joint Pain SOB/CONTRERAS    Pruritis/Rash Nausea/vomit    Tolerating PT/OT Diarrhea    Tolerating Diet Constipation    Other y Samuels vision Could NOT obtain due to:   
 
Objective: VITALS:  
Last 24hrs VS reviewed since prior progress note. Most recent are: 
Patient Vitals for the past 24 hrs: 
 Temp Pulse Resp BP SpO2  
10/31/18 1106  63 18  97 % 10/31/18 1100  68 13 138/69 96 % 10/31/18 1019  66 20  93 % 10/31/18 1000  66 23 136/80 95 % 10/31/18 0912  73     
10/31/18 0700  65 18 137/72 95 % 10/31/18 0621  74 20 161/78 96 % 10/31/18 0600  69 21 129/67 94 % 10/31/18 0545  65 10 144/84 93 % 10/31/18 0530  60 17 154/72 94 % 10/31/18 0515  63 21 148/67 93 % 10/31/18 0500  67 13 146/75 94 % 10/31/18 0445  60 16 125/71 94 % 10/31/18 0430  66 21 143/83 93 % 10/31/18 0415  (!) 59 22 145/79 93 % 10/31/18 0401  (!) 56 21 151/63 95 % 10/31/18 0345  65 22 129/68 93 % 10/31/18 0330  78 16 139/64 93 % 10/31/18 0315  74 18 131/63 91 % 10/31/18 0300  67 19 136/72 93 % 10/31/18 0245  62 22 136/54 93 % 10/31/18 0215  71 18 131/83 92 % 10/31/18 0155  77  158/60   
10/31/18 0145  73 17 158/60 94 % 10/31/18 0130  89 26 160/63 94 % 10/31/18 0115  83 25 153/67 94 % 10/31/18 0100  78 20 150/68 95 % 10/31/18 0045  72 19 150/56 97 % 10/31/18 0030  67 21 163/58 96 % 10/31/18 0015  64 21 157/47 94 % 10/30/18 2330  77 28 149/83 95 % 10/30/18 2315  72 22 154/84 94 % 10/30/18 2253  84 23 166/77 93 % 10/30/18 2245  75 27 164/71 91 % 10/30/18 2215  70 21 136/74 93 % 10/30/18 2200  72 24 132/75 91 % 10/30/18 2145  68 26 147/64 91 % 10/30/18 2130  69 21 137/62 91 % 10/30/18 2115  66 15 142/72 92 % 10/30/18 2106  66  130/73 94 % 10/30/18 2015  68 15 150/66 95 % 10/30/18 2000  75 24 154/67 93 % 10/30/18 1600  74 25 140/72 95 % 10/30/18 1530  (!) 56 22 173/79 94 % 10/30/18 1430  75 18 150/73 96 % 10/30/18 1400  77 20 163/65 94 % 10/30/18 1245  76 19 170/77 94 % 10/30/18 1156 97.4 °F (36.3 °C) 80 18 (!) 121/32 100 % Intake/Output Summary (Last 24 hours) at 10/31/2018 1144 Last data filed at 10/31/2018 6755 Gross per 24 hour Intake 27.5 ml Output 1125 ml Net -1097.5 ml PHYSICAL EXAM: 
General: WD, WN. Alert, cooperative, no acute distress   
EENT:  EOMI. Anicteric sclerae. MMM Resp:  Coarse BS, rhonchi CV:  Regular  rhythm,  No edema GI:  Soft, Non distended, Non tender.  +Bowel sounds Neurologic:  Alert and oriented X 3, normal speech, Psych:   Fair  insight. Not anxious nor agitated Skin:  No rashes. No jaundice Reviewed most current lab test results and cultures  YES 
 Reviewed most current radiology test results   YES Review and summation of old records today    NO Reviewed patient's current orders and MAR    YES 
PMH/SH reviewed - no change compared to H&P 
________________________________________________________________________ Care Plan discussed with: 
  Comments Patient y Family RN y   
Care Manager Consultant Multidiciplinary team rounds were held today with , nursing, pharmacist and clinical coordinator. Patient's plan of care was discussed; medications were reviewed and discharge planning was addressed. ________________________________________________________________________ Total NON critical care TIME:  35   Minutes Total CRITICAL CARE TIME Spent:   Minutes non procedure based Comments >50% of visit spent in counseling and coordination of care y   
________________________________________________________________________ Bevely MD Dannie  
 
Procedures: see electronic medical records for all procedures/Xrays and details which were not copied into this note but were reviewed prior to creation of Plan. LABS: 
I reviewed today's most current labs and imaging studies. Pertinent labs include: 
Recent Labs 10/31/18 
0159 10/30/18 
1301 WBC 16.9* 15.7* HGB 12.7 12.7 HCT 38.6 39.2  186 Recent Labs 10/31/18 
0159 10/30/18 
1301 * 134* K 4.2 4.2  101 CO2 29 28 * 164* BUN 16 19 CREA 0.90 1.01  
CA 8.3* 8.2* ALB 3.5 3.6 TBILI 0.5 0.4 SGOT 25 21 ALT 32 31 INR  --  1.2* Signed: Abrahan Pandey MD

## 2018-10-31 NOTE — ED NOTES
Spoke with Nikky Crenshaw, pharmacist about Cardene drip being ordered but not started. Pharmacist stated to hold off on Cardene drip due to pt's BP of 166/77.

## 2018-10-31 NOTE — PROGRESS NOTES
Problem: Mobility Impaired (Adult and Pediatric) Goal: *Acute Goals and Plan of Care (Insert Text) Physical Therapy Goals Initiated 10/31/2018 1. Patient will move from supine to sit and sit to supine  and scoot up and down in bed with independence within 7 day(s). 2.  Patient will transfer from bed to chair and chair to bed with independence using the least restrictive device within 7 day(s). 3.  Patient will perform sit to stand with independence within 7 day(s). 4.  Patient will ambulate with modified independence for 200 feet with the least restrictive device within 7 day(s). 5.  Patient will ascend/descend 4 stairs with handrail(s) with supervision/set-up within 7 day(s). physical Therapy EVALUATION- neuro population Patient: Ector Cooper (84 y.o. male) Date: 10/31/2018 Primary Diagnosis: Stroke (cerebrum) ([facility] [address] TIA (transient ischemic attack) Precautions: fall, L field cut ASSESSMENT : 
Based on the objective data described below, the patient presents with L homonymous hemianopsia, limitations in gait, functional mobility and new need for O2. Cleared for session by RN. Pt lives in a motel/apt on first floor, no steps. He has been living alone and completing all ADLs and amb independently without device. He is a  and is retired. At this time, pt does exhibit full L field cut with the hemianopsia. He does not appear to exhibit L neglect but does need cues to turn head to scan L visual field. He shows good strength and sensation w/o focal deficit. He is confused to time but follows commands well and is very cooperative and pleasant. He is able to come to the EOB to the R with S. He would likely need increased cues to L but unable to assess due to lines at this time. He shows good sitting balance. He is able to come to stand with S. He takes shortened steps at the bedside limited by lines with CGA and unilateral support on bedrail.  He does not exhibit listing but states he feels somewhat off balance. He returned to bed with min A for LEs. Call bell in reach. Pt on 2L O2 throughout session. This is new for pt as he does not wear O2 at home. He does exhibit SOB and respiratory rate increases from 18 to 30 with activity but sats remain 93% and above. Given new visual deficit, effected function resulting, new O2 requirements which would effect safety with visual cut at home if needed at d/c, and given previous high level of function, pt would benefit from inpatient rehab to address the issues. He is motivated and shows good potential to improve and tolerate intensive rehab. Patient will benefit from skilled intervention to address the above impairments. Patients rehabilitation potential is considered to be Excellent Factors which may influence rehabilitation potential include:  
[x]           None noted 
[]           Mental ability/status []           Medical condition 
[]           Home/family situation and support systems 
[]           Safety awareness 
[]           Pain tolerance/management 
[]           Other: PLAN : 
Recommendations and Planned Interventions: 
[x]             Bed Mobility Training             [x]      Neuromuscular Re-Education [x]             Transfer Training                   []      Orthotic/Prosthetic Training 
[x]             Gait Training                         []      Modalities [x]             Therapeutic Exercises           []      Edema Management/Control [x]             Therapeutic Activities            [x]      Patient and Family Training/Education []             Other (comment): Frequency/Duration: Patient will be followed by physical therapy 5 times a week to address goals. Discharge Recommendations: Inpatient Rehab Further Equipment Recommendations for Discharge: TBD SUBJECTIVE:  
Patient stated It is 2017 or 2018.  OBJECTIVE DATA SUMMARY:  
HISTORY:   
Past Medical History:  
Diagnosis Date  Tobacco abuse 10/30/2018 History reviewed. No pertinent surgical history. Prior Level of Function/Home Situation: fully independent w/o device,  Home Situation Home Environment: Apartment # Steps to Enter: 0(to PT pt reports no steps to enter) One/Two Story Residence: One story Living Alone: Yes(pt reports to PT he does lives alone) Support Systems: (pt states to PT that he is alone, no help) Patient [facility] DME Used/Available at Home: None EXAMINATION/PRESENTATION/DECISION MAKING:  
Critical Behavior: 
Neurologic State: Alert Orientation Level: Oriented to person, Oriented to place, Oriented to situation Cognition: Follows commands Hearing: Auditory Auditory Impairment: Hard of hearing, bilateral 
Range Of Motion: 
AROM: Within functional limits PROM: Within functional limits Strength:   
Strength: Within functional limits Tone & Sensation:  
Tone: Normal 
  
  
  
  
Sensation: Intact Coordination: 
Coordination: Within functional limits Vision:  
 L homonymous hemianopsia Functional Mobility: 
Bed Mobility: 
  
Supine to Sit: Supervision Sit to Supine: Minimum assistance Transfers: 
Sit to Stand: Supervision Stand to Sit: Supervision Balance:  
Sitting: Intact Standing: Impaired Standing - Static: Good Standing - Dynamic : FairAmbulation/Gait Training:Distance (ft): 4 Feet (ft) Assistive Device: Other (comment)(uses single limb support on bed rail) Ambulation - Level of Assistance: Contact guard assistance Base of Support: Widened Speed/Marcia: Slow(just at EOB to adjust position due to lines) Functional Measure Barthel Index: 
 
Bathin Bladder: 10 Bowels: 10 
Groomin Dressin Feedin Mobility: 0 Stairs: 0 Toilet Use: 5 Transfer (Bed to Chair and Back): 10 Total: 45 
 
 
 Barthel and G-code impairment scale: 
Percentage of impairment CH 
0% CI 
1-19% CJ 
20-39% CK 
40-59% CL 
60-79% CM 
80-99% CN 
100% Barthel Score 0-100 100 99-80 79-60 59-40 20-39 1-19 
 0 Barthel Score 0-20 20 17-19 13-16 9-12 5-8 1-4 0 The Barthel ADL Index: Guidelines 1. The index should be used as a record of what a patient does, not as a record of what a patient could do. 2. The main aim is to establish degree of independence from any help, physical or verbal, however minor and for whatever reason. 3. The need for supervision renders the patient not independent. 4. A patient's performance should be established using the best available evidence. Asking the patient, friends/relatives and nurses are the usual sources, but direct observation and common sense are also important. However direct testing is not needed. 5. Usually the patient's performance over the preceding 24-48 hours is important, but occasionally longer periods will be relevant. 6. Middle categories imply that the patient supplies over 50 per cent of the effort. 7. Use of aids to be independent is allowed. Therese Kidd., Barthel, D.W. ([id number]). Functional evaluation: the Barthel Index. [address] [facility] (14)2. Blanca [place] marcello MOE Pires, Maria Eugenia Dial.Sofía., [place], [address] [place] [address] (1999). Measuring the change indisability after inpatient rehabilitation; comparison of the responsiveness of the Barthel Index and Functional [place] Measure. Journal of Neurology, Neurosurgery, and Psychiatry, 66(4), 508-985. James Acosta, N.J.A, DAREK Pickett, & Stephen Ni MEmmyA. (2004.) Assessment of post-stroke quality of life in cost-effectiveness studies: The usefulness of the Barthel Index and the EuroQoL-5D. [facility], 13, 719-88 Arthur Balance Test: 
 
  
  
 Unable to fully complete ARTHUR testing on eval due to limitations of room and lines in ED. Will complete in subsequent session. 56=Maximum possible score;  
0-20=High fall risk 21-40=Moderate fall risk 41-56=Low fall risk Reaves Balance Test and G-code impairment scale: 
Percentage of Impairment CH 
 
0% 
 CI 
 
1-19% CJ 
 
20-39% CK 
 
40-59% CL 
 
60-79% CM 
 
80-99% CN  
 
100% Edwin Hudson Score 0-56 56 45-55 34-44 23-33 12-22 1-11 0  
 
G codes: In compliance with CMSs Claims Based Outcome Reporting, the following G-code set was chosen for this patient based on their primary functional limitation being treated: The outcome measure chosen to determine the severity of the functional limitation was the barthel with a score of 45/100 which was correlated with the impairment scale. ? Mobility - Walking and Moving Around:  
  - CURRENT STATUS: CK - 40%-59% impaired, limited or restricted  - GOAL STATUS: CI - 1%-19% impaired, limited or restricted  - D/C STATUS:  ---------------To be determined--------------- Physical Therapy Evaluation Charge Determination History Examination Presentation Decision-Making MEDIUM  Complexity : 1-2 comorbidities / personal factors will impact the outcome/ POC  HIGH Complexity : 4+ Standardized tests and measures addressing body structure, function, activity limitation and / or participation in recreation  MEDIUM Complexity : Evolving with changing characteristics  LOW Complexity : FOTO score of  Based on the above components, the patient evaluation is determined to be of the following complexity level: LOW Pain: 
Pain Scale 1: Numeric (0 - 10) Pain Intensity 1: 0 Activity Tolerance:  
Good for session; see above notes Please refer to the flowsheet for vital signs taken during this treatment. After treatment:  
[]     Patient left in no apparent distress sitting up in chair 
[x]     Patient left in no apparent distress in bed 
[x]     Call bell left within reach [x]     Nursing notified 
[]     Caregiver present 
[]     Bed alarm activated COMMUNICATION/EDUCATION:  
The patients plan of care was discussed with: Registered Nurse. Patient was educated regarding his deficit(s) of visual field cut and gait decline as this relates to his diagnosis of CVA. He demonstrated Good understanding as evidenced by his ability to discuss. [x]  Fall prevention education was provided and the patient/caregiver indicated understanding. [x]  Patient/family have participated as able in goal setting and plan of care. [x]  Patient/family agree to work toward stated goals and plan of care. []  Patient understands intent and goals of therapy, but is neutral about his/her participation. []  Patient is unable to participate in goal setting and plan of care. Thank you for this referral. 
Deuce Coyne, PT Time Calculation: 21 mins

## 2018-10-31 NOTE — ACP (ADVANCE CARE PLANNING)
Received In Basket request for Advance Medical Directive consult. Review of chart notes show that at the present time, patient is a poor historian, did not state the year correctly and is not appropriate to complete Advance Medical Directive at this time.   Chaplains will follow patient if/when he is admitted to hospital.  EDWARDO Pham, [address] [facility], [facility]  ELENA [facility] Paging Service  [facility] ([phone])

## 2018-10-31 NOTE — PROGRESS NOTES
Reason for Admission:   Pt was admitted with a diagnosis of stroke, TIA 
                
RRAT Score:          8 Plan for utilizing home health:      TBD. Pt has not utilized home health or SNF prior to admission Likelihood of Readmission:  Moderate Transition of Care Plan:  CM met with pt re: assessment, discharge planning. MC introduced self, role. Pt verbalized understanding. Pt verified demographic information on chart. Pt's PCP is Dr. Faina Moore who the pt has an appointment to see on Friday, 11/2/18. Pt lives alone in a first story motel room. Pt reports he is independent in ADL/IADL needs at baseline. Pt has not utilized home health or SNF prior to admission. Pt does not have access to DME in the home. Pt does drive, but has supportive friends to assist with transportation as needed, to include discharge. Pt stated he has no questions or needs at this time. CM acknowledges consult re: Eliquis. CM will require a script to send to the pharmacy re: cost of Eliquis. CM will continue to remain available for support, discharge planning as needed. 16:41 CM acknowledges consult re: MRI at [facility]. CM to inform nursing staff to arrange appointment time for pt to get an MRI done at [facility] with the transfer center and accepting physician information. CM will help coordinate transportation if needed at that time. CM to continue to remain available for support, discharge planning as needed. Care Management Interventions PCP Verified by CM: Yes Last Visit to PCP: 11/02/18 Mode of Transport at Discharge: Other (see comment)(Pt has a friend to transport at discharge) Transition of Care Consult (CM Consult): Discharge Planning(Pt is independent in ADL/IADL needs at baseline. Pt has not utilized home health or SNF prior to admission) Discharge Durable Medical Equipment: No(Pt does not have DME in the home) Physical Therapy Consult:  Yes 
 Occupational Therapy Consult: Yes Speech Therapy Consult: Yes Current Support Network: Own Home, Lives Alone(Pt lives in a motel on the first floor alone) Confirm Follow Up Transport: Self(Pt does drive, but has supportive friends to assist with transportation as needed) Plan discussed with Pt/Family/Caregiver: Yes Discharge Location Discharge Placement: (TBD) Octavia Newton MSW [facility] [phone]

## 2018-10-31 NOTE — ED NOTES
Case discussed with Dr Bryn Wells; Will stop IVF and order cardiac regular diet for pt. Will contact [facility] MRI to schedule testing. EMTALA form initiated by MD.   aware.

## 2018-10-31 NOTE — ED NOTES
TRANSFER - OUT REPORT: 
 
Verbal report given to [facility] RN(name) on Verito Khoury  being transferred to Room 28(unit) for routine progression of care Report consisted of patients Situation, Background, Assessment and  
Recommendations(SBAR). Information from the following report(s) SBAR, Kardex, ED Summary and MAR was reviewed with the receiving nurse. Lines:  
Peripheral IV 10/30/18 Right Antecubital (Active) Site Assessment Clean, dry, & intact 10/31/2018  1:23 AM  
Phlebitis Assessment 0 10/31/2018  1:23 AM  
Infiltration Assessment 0 10/31/2018  1:23 AM  
Dressing Status Clean, dry, & intact 10/31/2018  1:23 AM  
Dressing Type Transparent;Tape 10/31/2018  1:23 AM  
Hub Color/Line Status Infusing;Pink;Flushed 10/31/2018  1:23 AM  
  
 
Opportunity for questions and clarification was provided. Patient transported with: 
 O2 @ 2 liters Tech, RN. This nurse paged Dr. Matilda Castrejon for update on MRI

## 2018-10-31 NOTE — ED NOTES
Pt resting, remains on EKG monitor in NSR, denies any SOB or abdominal pain. Denies headache, reports visual loss remains centrally, but states he can see with peripheral.  Wearing glasses. IVF infusing without difficulty. Waiting on plans for MRI at [facility]. Pt on specialty bed.

## 2018-11-01 ENCOUNTER — HOSPITAL ENCOUNTER (OUTPATIENT)
Dept: MRI IMAGING | Age: 68
Discharge: HOME | End: 2018-11-01
Payer: MEDICARE

## 2018-11-01 ENCOUNTER — HOSPITAL ENCOUNTER (OUTPATIENT)
Dept: MRI IMAGING | Age: 68
Discharge: HOME | End: 2018-11-01
Attending: INTERNAL MEDICINE
Payer: MEDICARE

## 2018-11-01 DIAGNOSIS — H54.7 VISION LOSS: ICD-10-CM

## 2018-11-01 LAB
ALBUMIN SERPL-MCNC: 3.6 G/DL (ref 3.5–5)
ALBUMIN/GLOB SERPL: 0.9 {RATIO} (ref 1.1–2.2)
ALP SERPL-CCNC: 97 U/L (ref 45–117)
ALT SERPL-CCNC: 31 U/L (ref 12–78)
ANION GAP SERPL CALC-SCNC: 7 MMOL/L (ref 5–15)
AST SERPL-CCNC: 20 U/L (ref 15–37)
BASOPHILS # BLD: 0.1 K/UL (ref 0–0.1)
BASOPHILS NFR BLD: 1 % (ref 0–1)
BILIRUB SERPL-MCNC: 0.5 MG/DL (ref 0.2–1)
BUN SERPL-MCNC: 15 MG/DL (ref 6–20)
BUN/CREAT SERPL: 13 (ref 12–20)
CALCIUM SERPL-MCNC: 8.5 MG/DL (ref 8.5–10.1)
CHLORIDE SERPL-SCNC: 102 MMOL/L (ref 97–108)
CO2 SERPL-SCNC: 25 MMOL/L (ref 21–32)
CREAT SERPL-MCNC: 1.14 MG/DL (ref 0.7–1.3)
DIFFERENTIAL METHOD BLD: ABNORMAL
EOSINOPHIL # BLD: 0.4 K/UL (ref 0–0.4)
EOSINOPHIL NFR BLD: 3 % (ref 0–7)
ERYTHROCYTE [DISTWIDTH] IN BLOOD BY AUTOMATED COUNT: 13.6 % (ref 11.5–14.5)
GLOBULIN SER CALC-MCNC: 3.9 G/DL (ref 2–4)
GLUCOSE BLD STRIP.AUTO-MCNC: 165 MG/DL (ref 65–100)
GLUCOSE BLD STRIP.AUTO-MCNC: 167 MG/DL (ref 65–100)
GLUCOSE BLD STRIP.AUTO-MCNC: 176 MG/DL (ref 65–100)
GLUCOSE BLD STRIP.AUTO-MCNC: 246 MG/DL (ref 65–100)
GLUCOSE SERPL-MCNC: 167 MG/DL (ref 65–100)
HCT VFR BLD AUTO: 41.5 % (ref 36.6–50.3)
HGB BLD-MCNC: 13.2 G/DL (ref 12.1–17)
IMM GRANULOCYTES # BLD: 0.2 K/UL (ref 0–0.04)
IMM GRANULOCYTES NFR BLD AUTO: 1 % (ref 0–0.5)
LYMPHOCYTES # BLD: 2.3 K/UL (ref 0.8–3.5)
LYMPHOCYTES NFR BLD: 15 % (ref 12–49)
MAGNESIUM SERPL-MCNC: 2.2 MG/DL (ref 1.6–2.4)
MCH RBC QN AUTO: 26.7 PG (ref 26–34)
MCHC RBC AUTO-ENTMCNC: 31.8 G/DL (ref 30–36.5)
MCV RBC AUTO: 84 FL (ref 80–99)
MONOCYTES # BLD: 1.2 K/UL (ref 0–1)
MONOCYTES NFR BLD: 8 % (ref 5–13)
NEUTS SEG # BLD: 11 K/UL (ref 1.8–8)
NEUTS SEG NFR BLD: 72 % (ref 32–75)
NRBC # BLD: 0 K/UL (ref 0–0.01)
NRBC BLD-RTO: 0 PER 100 WBC
PLATELET # BLD AUTO: 210 K/UL (ref 150–400)
PMV BLD AUTO: 11.2 FL (ref 8.9–12.9)
POTASSIUM SERPL-SCNC: 4.3 MMOL/L (ref 3.5–5.1)
PROT SERPL-MCNC: 7.5 G/DL (ref 6.4–8.2)
RBC # BLD AUTO: 4.94 M/UL (ref 4.1–5.7)
SERVICE CMNT-IMP: ABNORMAL
SODIUM SERPL-SCNC: 134 MMOL/L (ref 136–145)
WBC # BLD AUTO: 15.2 K/UL (ref 4.1–11.1)

## 2018-11-01 PROCEDURE — 70551 MRI BRAIN STEM W/O DYE: CPT

## 2018-11-01 PROCEDURE — 74011250637 HC RX REV CODE- 250/637: Performed by: INTERNAL MEDICINE

## 2018-11-01 PROCEDURE — 36415 COLL VENOUS BLD VENIPUNCTURE: CPT | Performed by: INTERNAL MEDICINE

## 2018-11-01 PROCEDURE — 92610 EVALUATE SWALLOWING FUNCTION: CPT

## 2018-11-01 PROCEDURE — 83735 ASSAY OF MAGNESIUM: CPT | Performed by: INTERNAL MEDICINE

## 2018-11-01 PROCEDURE — 94760 N-INVAS EAR/PLS OXIMETRY 1: CPT

## 2018-11-01 PROCEDURE — 65660000000 HC RM CCU STEPDOWN

## 2018-11-01 PROCEDURE — 70547 MR ANGIOGRAPHY NECK W/O DYE: CPT

## 2018-11-01 PROCEDURE — 80053 COMPREHEN METABOLIC PANEL: CPT | Performed by: INTERNAL MEDICINE

## 2018-11-01 PROCEDURE — 85025 COMPLETE CBC W/AUTO DIFF WBC: CPT | Performed by: INTERNAL MEDICINE

## 2018-11-01 PROCEDURE — 74011250637 HC RX REV CODE- 250/637: Performed by: PSYCHIATRY & NEUROLOGY

## 2018-11-01 PROCEDURE — 74011636637 HC RX REV CODE- 636/637: Performed by: INTERNAL MEDICINE

## 2018-11-01 PROCEDURE — 77010033678 HC OXYGEN DAILY

## 2018-11-01 PROCEDURE — 82962 GLUCOSE BLOOD TEST: CPT

## 2018-11-01 PROCEDURE — 74011250637 HC RX REV CODE- 250/637: Performed by: NURSE PRACTITIONER

## 2018-11-01 PROCEDURE — 70544 MR ANGIOGRAPHY HEAD W/O DYE: CPT

## 2018-11-01 RX ADMIN — APIXABAN 5 MG: 5 TABLET, FILM COATED ORAL at 08:45

## 2018-11-01 RX ADMIN — APIXABAN 5 MG: 5 TABLET, FILM COATED ORAL at 22:39

## 2018-11-01 RX ADMIN — Medication 10 ML: at 14:30

## 2018-11-01 RX ADMIN — Medication 10 ML: at 22:42

## 2018-11-01 RX ADMIN — Medication 10 ML: at 03:53

## 2018-11-01 RX ADMIN — PRAVASTATIN SODIUM 80 MG: 40 TABLET ORAL at 22:39

## 2018-11-01 RX ADMIN — INSULIN LISPRO 3 UNITS: 100 INJECTION, SOLUTION INTRAVENOUS; SUBCUTANEOUS at 17:20

## 2018-11-01 RX ADMIN — INSULIN LISPRO 3 UNITS: 100 INJECTION, SOLUTION INTRAVENOUS; SUBCUTANEOUS at 14:30

## 2018-11-01 RX ADMIN — INSULIN LISPRO 2 UNITS: 100 INJECTION, SOLUTION INTRAVENOUS; SUBCUTANEOUS at 22:40

## 2018-11-01 RX ADMIN — Medication 10 ML: at 22:39

## 2018-11-01 RX ADMIN — INSULIN LISPRO 3 UNITS: 100 INJECTION, SOLUTION INTRAVENOUS; SUBCUTANEOUS at 08:45

## 2018-11-01 RX ADMIN — UMECLIDINIUM BROMIDE AND VILANTEROL TRIFENATATE 1 PUFF: 62.5; 25 POWDER RESPIRATORY (INHALATION) at 08:52

## 2018-11-01 RX ADMIN — CLOPIDOGREL BISULFATE 75 MG: 75 TABLET ORAL at 08:45

## 2018-11-01 NOTE — PROGRESS NOTES
Speech Pathology bedside swallow evaluation/discharge Patient: Art Richmond (85 y.o. male) Date: 11/1/2018 Primary Diagnosis: Stroke (cerebrum) ([facility] [address] TIA (transient ischemic attack) Precautions:     
 
ASSESSMENT : 
Based on the objective data described below, the patient presents with an intact oropharyngeal swallow. Patient with timely/complete mastication, timely swallow initiation, functional hyolaryngeal elevation/excursion, and no overt s/s aspiration observed. Skilled therapy provided by a speech-language pathologist for swallowing is not indicated at this time. However, will continue to follow for integrated language treatment. Addendum 13:42: Returned to see patient for integrated language treatment, however patient still at [facility] for MRI. Will continue to follow. PLAN : 
Recommendations: 
--Continue regular/thin liquid diet 
--Straws ok 
--Meds whole 
--SLP to follow for integrated language treatment Discharge Recommendations: Inpatient Rehab SUBJECTIVE:  
Patient stated She helped feed me re: breakfast this morning. Patient remains confused, stating month is August. 
 
OBJECTIVE:  
 
Past Medical History:  
Diagnosis Date  Diabetes mellitus type 2, controlled ([facility] [address] 10/31/2018  [facility]  Tobacco abuse 10/30/2018 History reviewed. No pertinent surgical history. Prior Level of Function/Home Situation:  
Home Situation Home Environment: Apartment # Steps to Enter: 0(to PT pt reports no steps to enter) One/Two Story Residence: One story Living Alone: Yes(pt reports to PT he does lives alone) Support Systems: (pt states to PT that he is alone, no help) [facility] Current DME Used/Available at Home: None Diet prior to admission: regular/thin Current Diet:  Regular/thin Cognitive and Communication Status: 
Neurologic State: Alert, Appropriate for age Orientation Level: Oriented to person, Oriented to place, Oriented to situation, Disoriented to time(stated month was august) Cognition: Decreased command following Perception: Visual(vision issues) Perseveration: No perseveration noted Safety/Judgement: Awareness of environment, Decreased insight into deficits Oral Assessment: 
Oral Assessment Labial: No impairment Dentition: Upper dentures; Natural;Limited Oral Hygiene: moist oral mucosa Lingual: No impairment Velum: No impairment Mandible: No impairment P.O. Trials: 
Patient Position: upright in bed Vocal quality prior to P.O.: No impairment Consistency Presented: Thin liquid;Puree; Solid How Presented: SLP-fed/presented;Straw;Successive swallows;Spoon Bolus Acceptance: No impairment Bolus Formation/Control: No impairment Propulsion: No impairment Oral Residue: None Initiation of Swallow: No impairment Laryngeal Elevation: Functional 
Aspiration Signs/Symptoms: None Pharyngeal Phase Characteristics: No impairment, issues, or problems Effective Modifications: None Cues for Modifications: None Oral Phase Severity: No impairment Pharyngeal Phase Severity : No impairment NOMS:  
The NOMS functional outcome measure was used to quantify this patient's level of swallowing impairment. Based on the NOMS, the patient was determined to be at level 7 for swallow function G Codes: In compliance with CMSs Claims Based Outcome Reporting, the following G-code set was chosen for this patient based the use of the NOMS functional outcome to quantify this patient's level of swallowing impairment. Using the NOMS, the patient was determined to be at level 7 for swallow function which correlates with the CH= 0% level of severity.  
 
Based on the objective assessment provided within this note, the current, goal, and discharge g-codes are as follows: 
 
Swallow  Swallowing: 
 Swallow Current Status CH= 0% 
  Swallow Goal Status CH= 0% 
 Swallow D/C Status CH= 0% NOMS Swallowing Levels: 
Level 1 (CN): NPO Level 2 (CM): NPO but takes consistency in therapy Level 3 (CL): Takes less than 50% of nutrition p.o. and continues with nonoral feedings; and/or safe with mod cues; and/or max diet restriction Level 4 (CK): Safe swallow but needs mod cues; and/or mod diet restriction; and/or still requires some nonoral feeding/supplements Level 5 (CJ): Safe swallow with min diet restriction; and/or needs min cues Level 6 (CI): Independent with p.o.; rare cues; usually self cues; may need to avoid some foods or needs extra time Level 7 (CH): Independent for all p.o. DUYEN. (2003). National Outcomes Measurement System (NOMS): Adult Speech-Language Pathology User's Guide. Pain: 
Pain Scale 1: Numeric (0 - 10) Pain Intensity 1: 0 After treatment:  
[] Patient left in no apparent distress sitting up in chair 
[x] Patient left in no apparent distress in bed 
[x] Call bell left within reach [x] Nursing notified 
[] Caregiver present 
[] Bed alarm activated COMMUNICATION/EDUCATION:  
The patients plan of care including findings, recommendations, and recommended diet changes were discussed with: Registered Nurse. [x] Patient/family have participated as able and agree with findings and recommendations. [] Patient is unable to participate in plan of care at this time. Thank you for this referral. 
ONDINA Healy Time Calculation: 13 mins

## 2018-11-01 NOTE — PROGRESS NOTES
has been called twice at this point to try to set up transportation to take patient to [facility]. No phone calls have been returned yet.

## 2018-11-01 NOTE — PROGRESS NOTES
Occupational Therapy Pt is off the floor and not available to initiate OT Evaluation. Will defer and continue to follow.

## 2018-11-01 NOTE — PROGRESS NOTES
Bedside shift change report given to Camden Lubin RN (oncoming nurse) by Montserrat Medina RN (offgoing nurse). Report included the following information SBAR, Intake/Output, MAR, Accordion and Recent Results. 
  
Zone Phone:   [phone] 
  
  
Significant changes during shift:  none 
  
  
  
Patient Information 
  
Wedny Horvath 
79 y.o. 
10/30/2018 12:07 PM by Sd Campos MD. Wendy Horvath was admitted from Home 
  
Problem List 
  
    
Patient Active Problem List  
  Diagnosis Date Noted  PAF (paroxysmal atrial fibrillation) ([address] 10/31/2018  Diabetes mellitus type 2, controlled ([address] 10/31/2018  Stroke (cerebrum) ([address] 10/30/2018  TIA (transient ischemic attack) 10/30/2018  Elevated troponin 10/30/2018  Tobacco abuse 10/30/2018  
  
    
Past Medical History:  
Diagnosis Date  Diabetes mellitus type 2, controlled ([address] 10/31/2018  PAF (paroxysmal atrial fibrillation) ([address] 10/31/2018  Tobacco abuse 10/30/2018  
  
  
  
Core Measures: 
  
CVA: Yes Yes 
  
Activity Status: 
  
OOB to Chair No 
Ambulated this shift No  
Bed Rest Yes 
  
Supplemental O2: (If Applicable) 
  
NC Yes On 2 Liters/min 
  
DVT prophylaxis: 
  
DVT prophylaxis Med- Yes DVT prophylaxis SCD or AIDAN- No  
  
Patient Safety: 
  
Falls Score Total Score: 4 Safety Level_______ Bed Alarm On? No 
Sitter? No 
  
Plan for upcoming shift: Monitor, MRI tomorrow 
  
  
  
Discharge Plan: No TBD 
  
Active Consults: 
IP CONSULT TO NEUROLOGY 
IP CONSULT TO NEUROLOGY 
IP CONSULT TO CARDIOLOGY

## 2018-11-01 NOTE — PROGRESS NOTES
Initial Nutrition Assessment: 
 
INTERVENTIONS/RECOMMENDATIONS:  
· Meals/Snacks: General/healthful diet: Cardiac/consistent carbohydrate ASSESSMENT:  
Patient medically noted for stroke and vision loss. Plans for MRI at [facility] today. PMH for DM. Currently receiving a cardiac diet. Elevated BG and A1c noted. Will add CCD to diet. Patient reports a good appetite and intake PTA. Awaiting breakfast at time of visit. Provided menu and explanation of room service. Diet Order: Cardiac 
% Eaten:  No data found. Pertinent Medications: [x]Reviewed []Other: Elequis, Plavix, Humalog, Miralax, Pravastatin Pertinent Labs: [x]Reviewed []Other:Na 134, A1c 9.3 Food Allergies: [x]None []Other Last BM: 11/1  [x]Active     []Hyperactive  []Hypoactive       [] Absent BS Skin:    [x] Intact   [] Incision  [] Breakdown: [] Edema []Other: Anthropometrics:  
Height: 6' 3\" (190.5 cm) Weight: (!) 162.3 kg (357 lb 12.9 oz) IBW (%IBW):   ( ) UBW (%UBW):   (  %) Last Weight Metrics: 
Weight Loss Metrics 10/31/2018 9/10/2018 Today's Wt 357 lb 12.9 oz 353 lb BMI 44.72 kg/m2 44.12 kg/m2 BMI: Body mass index is 44.72 kg/m². This BMI is indicative of: 
 []Underweight    []Normal    []Overweight    [] Obesity   [x] Extreme Obesity (BMI>40) Estimated Nutrition Needs (Based on):  
6326 Kcals/day(BMR (2456) x 1. 3AF -1000kcal) , 107 g(1.2 g/kg IBW) Protein Carbohydrate: At Least 130 g/day  Fluids: 2250 mL/day (1ml/kcal) Pt expected to meet estimated nutrient needs: [x]Yes []No 
 
NUTRITION DIAGNOSES:  
Problem:  No nutritional diagnosis at this time Etiology: related to Signs/Symptoms: as evidenced by NUTRITION INTERVENTIONS: 
Meals/Snacks: General/healthful diet GOAL:  
PO intake >75% of meals next 5-7 days LEARNING NEEDS (Diet, Food/Nutrient-Drug Interaction):  
 [x] None Identified 
 [] Identified and Education Provided/Documented [] Identified and Pt declined/was not appropriate Cultural, Baptist, OR Ethnic Dietary Needs:  
 [x] None Identified 
 [] Identified and Addressed 
 
 [x] Interdisciplinary Care Plan Reviewed/Documented  
 [x] Discharge Planning:  Heart healthy/consistent carb diet MONITORING /EVALUATION:  
Food/Nutrient Intake Outcomes: Total energy intake Physical Signs/Symptoms Outcomes: Weight/weight change NUTRITION RISK:  
 [] High              [] Moderate           [x]  Low  []  Minimal/Uncompromised PT SEEN FOR:  
 [x]  MD Consult: [x]Calorie Count/obesity []Diabetic Diet Education []Diet Education []Electrolyte Management []General Nutrition Management and Supplements []Management of Tube Feeding []TPN Recommendations []  RN Referral:  []MST score >=2 
   []Enteral/Parenteral Nutrition PTA []Pregnant: Gestational DM or Multigestation 
   []Pressure Ulcer/Wound Care needs 
     
[]  Low BMI 
[]  SINGH Lewis Pager [phone] Weekend Pager [phone]

## 2018-11-01 NOTE — DIABETES MGMT
DTC Consult Note Recommendations/ Comments: If appropriate, please consider: 
 - addition of Glimepiride 2 mg with breakfast (based on A1C he will likely need additional medication at discharge) Current hospital DM medication: Lispro correction, normal sensitivity Consult received for:  [x]             Assessment of home management Chart reviewed and initial evaluation complete on Justin Khan. Patient is a 79 y.o. male with a history of Type 2 Diabetes on oral agent (monotherapy): metformin (generic) at home. He is currently experiencing vision loss that occurred yesterday with stroke. He has a working glucose meter, but has not used it in > 6 months. Discussed resuming blood sugar testing BID and suggested he check the expiration date on strips he has. He mostly avoids sweet drinks, but sometimes has sweet tea when eating out, so discussed alternatives. He met with a dietician for education a year ago. Discussed scheduling a follow up, but he plans to see a new PCP soon and if vision has improved, he may ask for referral and may need an individual appointment. Assessed and instructed patient on the following:  
·  interpretation of lab results, blood sugar goals, complications of diabetes mellitus, hypoglycemia prevention and treatment and nutrition Encouraged the following:  
· dietary modifications: eliminate sweet drinks, regular blood sugar monitorin times daily Provided patient with the following: [x]             Survival skills education materials [x]             Outpatient DTC contact number Discussed with patient and/or family need for follow up appointment for diabetes management after discharge. A1c:  
Lab Results Component Value Date/Time Hemoglobin A1c 9.3 (H) 10/31/2018 01:59 AM  
 
 
Recent Glucose Results:  
Lab Results Component Value Date/Time   (H) 2018 03:51 AM  
 GLUCPOC 176 (H) 2018 06:40 AM  
 GLUCPOC 218 (H) 10/31/2018 09:18 PM  
 GLUCPOC 138 (H) 10/31/2018 05:42 PM  
  
 
Lab Results Component Value Date/Time Creatinine 1.14 11/01/2018 03:51 AM  
 
Estimated Creatinine Clearance: 104.2 mL/min (based on SCr of 1.14 mg/dL). Active Orders Diet DIET CARDIAC Regular; Consistent Carb 2200kcal  
  
 
PO intake: No data found. Will continue to follow as needed. Thank you.  
Waldo Bishop, MS, RN, CDE

## 2018-11-01 NOTE — PROGRESS NOTES
Patient off the floor at this time. Transport to [facility] at this time. Will f/u for PT.   
 
Erika Ware, SPT

## 2018-11-01 NOTE — PROGRESS NOTES
I attempted to see the patient today, but he was temporarily at [facility] for MRI. I will try to see again tomorrow. See recommendations from yesterday. I did discuss with Dr. Ángela Serrano who feels that starting Eliquis is acceptable at this time from a neurologic standpoint.

## 2018-11-01 NOTE — PROGRESS NOTES
Bedside shift change report given to Radha Medina RN (oncoming nurse) by Henna Duff RN (offgoing nurse). Report included the following information SBAR, Intake/Output, MAR, Accordion and Recent Results. 
  
Zone Phone:   [phone] 
  
  
Significant changes during shift:  MRI at [facility] 
  
  
  
Patient Information 
  
Ector Cooper 
79 y.o. 
10/30/2018 12:07 PM by Melvin Sunshine MD. Ector Cooper was admitted from Home 
  
Problem List 
  
    
Patient Active Problem List  
  Diagnosis Date Noted  PAF (paroxysmal atrial fibrillation) ([address] 10/31/2018  Diabetes mellitus type 2, controlled ([address] 10/31/2018  Stroke (cerebrum) ([address] 10/30/2018  TIA (transient ischemic attack) 10/30/2018  Elevated troponin 10/30/2018  Tobacco abuse 10/30/2018  
  
    
Past Medical History:  
Diagnosis Date  Diabetes mellitus type 2, controlled ([address] 10/31/2018  PAF (paroxysmal atrial fibrillation) ([address] 10/31/2018  Tobacco abuse 10/30/2018  
  
  
  
Core Measures: 
  
CVA: Yes Yes 
  
Activity Status: 
  
OOB to Chair No 
Ambulated this shift No  
Bed Rest Yes 
  
Supplemental O2: (If Applicable) 
  
NC Yes On 2 Liters/min 
  
DVT prophylaxis: 
  
DVT prophylaxis Med- Yes DVT prophylaxis SCD or AIDAN- No  
  
Patient Safety: 
  
Falls Score Total Score: 4 Safety Level_______ Bed Alarm On? No 
Sitter? No 
  
Plan for upcoming shift: Monitor 
  
  
  
Discharge Plan: No TBD 
  
Active Consults: 
IP CONSULT TO NEUROLOGY 
IP CONSULT TO NEUROLOGY 
IP CONSULT TO CARDIOLOGY

## 2018-11-01 NOTE — PROGRESS NOTES
Called physician to get Admitting Physician's name for EMTALA. Physician stated would go to ED at [facility] if anything were to happen. This nurse let nursing supervisor know the situation. Nursing supervisor instructed this nurse to call MRI at [facility] and find out Radiologist who would be on, to use as the name on the EMTALA for the admitting physician. Called MRI at [facility] and was informed it would be Dr. Ivana Jain. MRI at [facility] instructed he would not be the admitted physician but could use his name on the EMTALA. However, if anything is to happen to the patient, he would be sent to the ED.

## 2018-11-01 NOTE — PROGRESS NOTES
Hospitalist Progress Note NAME: Isamar Garcia :  1950 MRN:  [id number] Assessment / Plan: CVA:  vision loss, CT shows Right parietal and occipital subacute vs acute CVA, due for MRI to be done at [facility] due to patients weight, ECHO WNL, c/w Eliquis, Neurology eval still pending HTN: hold ACE, use labetalol prn allowing permissive HTN. Increased Troponin: c/w Eliquis, Cardiology in the case,  by definition NSTEMI. DM type 2 with hyperglycemia: hold metformin for now, c/w SSI, HbA1C 9.3 Leukocytosis ua/xrays ngt: no clear signs of infection, U/A and CXR are negative, may be reactive, no signs of cellulitis Tobacco: counseled, c/w nicotine patch Morbid  obesity nutrition referral  BMI 44.12 Asthma/COPD - resume meds Surrogate Decision Maker: Sonal Still [phone] Baseline: ambulatory Code status: Full Prophylaxis: Eliquis Recommended Disposition: SAH/Rehab  
 
EMTALA filled contacted ER at [facility] in the case that something is needed while patient is away Dr. Suzen Cowden graciously will help in the ER Subjective: Chief Complaint / Reason for Physician Visit \"My vision is still the same\". Discussed with RN events overnight. Review of Systems: 
Symptom Y/N Comments  Symptom Y/N Comments Fever/Chills    Chest Pain Poor Appetite    Edema Cough    Abdominal Pain Sputum    Joint Pain SOB/CONTRERAS    Pruritis/Rash Nausea/vomit    Tolerating PT/OT Diarrhea    Tolerating Diet y Constipation    Other y Samuels vision Could NOT obtain due to:   
 
Objective: VITALS:  
Last 24hrs VS reviewed since prior progress note. Most recent are: 
Patient Vitals for the past 24 hrs: 
 Temp Pulse Resp BP SpO2  
18 0749 97.3 °F (36.3 °C) 68 20 126/50 93 % 18 0425 97.6 °F (36.4 °C) 73 19 138/66 94 % 10/31/18 2309 97.4 °F (36.3 °C) 78 19 138/61 93 % 10/31/18 2009 97.9 °F (36.6 °C) 77 17 139/67 93 % 10/31/18 1819 97.1 °F (36.2 °C) 76 19 138/77 95 % 10/31/18 1615 98.2 °F (36.8 °C) 71 19 129/60 93 % 10/31/18 1600  73 11 141/69 93 % 10/31/18 1515  81 20 152/65 95 % 10/31/18 1400  71 15 132/68 94 % 10/31/18 1325  66 16  94 % 10/31/18 1300  65 20 112/77 95 % 10/31/18 1106  63 18  97 % 10/31/18 1100  68 13 138/69 96 % Intake/Output Summary (Last 24 hours) at 11/1/2018 1038 Last data filed at 11/1/2018 8898 Gross per 24 hour Intake 300 ml Output 1451 ml Net -1151 ml PHYSICAL EXAM: 
General: WD, WN. Alert, cooperative, no acute distress   
EENT:  EOMI. Anicteric sclerae. MMM Resp:  Coarse BS, rhonchi CV:  Regular  rhythm,  No edema GI:  Soft, Non distended, Non tender.  +Bowel sounds Neurologic:  Alert and oriented X 3, normal speech, Psych:   Fair  insight. Not anxious nor agitated Skin:  No rashes. No jaundice Reviewed most current lab test results and cultures  YES Reviewed most current radiology test results   YES Review and summation of old records today    NO Reviewed patient's current orders and MAR    YES 
PMH/SH reviewed - no change compared to H&P 
________________________________________________________________________ Care Plan discussed with: 
  Comments Patient y Family RN y   
Care Manager Consultant Multidiciplinary team rounds were held today with , nursing, pharmacist and clinical coordinator. Patient's plan of care was discussed; medications were reviewed and discharge planning was addressed. ________________________________________________________________________ Total NON critical care TIME:  25   Minutes Total CRITICAL CARE TIME Spent:   Minutes non procedure based Comments >50% of visit spent in counseling and coordination of care y   
________________________________________________________________________ Tamanna Christianson MD  
 
 Procedures: see electronic medical records for all procedures/Xrays and details which were not copied into this note but were reviewed prior to creation of Plan. LABS: 
I reviewed today's most current labs and imaging studies. Pertinent labs include: 
Recent Labs 11/01/18 
0351 10/31/18 
0159 10/30/18 
1301 WBC 15.2* 16.9* 15.7* HGB 13.2 12.7 12.7 HCT 41.5 38.6 39.2  186 186 Recent Labs 11/01/18 
0351 10/31/18 
0159 10/30/18 
1301 * 134* 134* K 4.3 4.2 4.2  101 101 CO2 25 29 28 * 176* 164* BUN 15 16 19 CREA 1.14 0.90 1.01  
CA 8.5 8.3* 8.2* MG 2.2  --   --   
ALB 3.6 3.5 3.6 TBILI 0.5 0.5 0.4 SGOT 20 25 21 ALT 31 32 31 INR  --   --  1.2* Signed: Tiff Boyer MD

## 2018-11-02 LAB
ALBUMIN SERPL-MCNC: 3.5 G/DL (ref 3.5–5)
ALBUMIN/GLOB SERPL: 1 {RATIO} (ref 1.1–2.2)
ALP SERPL-CCNC: 91 U/L (ref 45–117)
ALT SERPL-CCNC: 27 U/L (ref 12–78)
ANION GAP SERPL CALC-SCNC: 6 MMOL/L (ref 5–15)
AST SERPL-CCNC: 19 U/L (ref 15–37)
BASOPHILS # BLD: 0.1 K/UL (ref 0–0.1)
BASOPHILS NFR BLD: 1 % (ref 0–1)
BILIRUB SERPL-MCNC: 0.4 MG/DL (ref 0.2–1)
BUN SERPL-MCNC: 12 MG/DL (ref 6–20)
BUN/CREAT SERPL: 13 (ref 12–20)
CALCIUM SERPL-MCNC: 8.5 MG/DL (ref 8.5–10.1)
CHLORIDE SERPL-SCNC: 102 MMOL/L (ref 97–108)
CO2 SERPL-SCNC: 27 MMOL/L (ref 21–32)
CREAT SERPL-MCNC: 0.95 MG/DL (ref 0.7–1.3)
DIFFERENTIAL METHOD BLD: ABNORMAL
EOSINOPHIL # BLD: 0.5 K/UL (ref 0–0.4)
EOSINOPHIL NFR BLD: 3 % (ref 0–7)
ERYTHROCYTE [DISTWIDTH] IN BLOOD BY AUTOMATED COUNT: 13.3 % (ref 11.5–14.5)
GLOBULIN SER CALC-MCNC: 3.6 G/DL (ref 2–4)
GLUCOSE BLD STRIP.AUTO-MCNC: 173 MG/DL (ref 65–100)
GLUCOSE BLD STRIP.AUTO-MCNC: 175 MG/DL (ref 65–100)
GLUCOSE BLD STRIP.AUTO-MCNC: 223 MG/DL (ref 65–100)
GLUCOSE BLD STRIP.AUTO-MCNC: 244 MG/DL (ref 65–100)
GLUCOSE SERPL-MCNC: 184 MG/DL (ref 65–100)
HCT VFR BLD AUTO: 39.7 % (ref 36.6–50.3)
HGB BLD-MCNC: 12.8 G/DL (ref 12.1–17)
IMM GRANULOCYTES # BLD: 0.2 K/UL (ref 0–0.04)
IMM GRANULOCYTES NFR BLD AUTO: 2 % (ref 0–0.5)
LYMPHOCYTES # BLD: 2 K/UL (ref 0.8–3.5)
LYMPHOCYTES NFR BLD: 14 % (ref 12–49)
MAGNESIUM SERPL-MCNC: 2.1 MG/DL (ref 1.6–2.4)
MCH RBC QN AUTO: 27 PG (ref 26–34)
MCHC RBC AUTO-ENTMCNC: 32.2 G/DL (ref 30–36.5)
MCV RBC AUTO: 83.8 FL (ref 80–99)
MONOCYTES # BLD: 1.1 K/UL (ref 0–1)
MONOCYTES NFR BLD: 8 % (ref 5–13)
NEUTS SEG # BLD: 10.1 K/UL (ref 1.8–8)
NEUTS SEG NFR BLD: 72 % (ref 32–75)
NRBC # BLD: 0 K/UL (ref 0–0.01)
NRBC BLD-RTO: 0 PER 100 WBC
PLATELET # BLD AUTO: 200 K/UL (ref 150–400)
PMV BLD AUTO: 10.9 FL (ref 8.9–12.9)
POTASSIUM SERPL-SCNC: 4.3 MMOL/L (ref 3.5–5.1)
PROT SERPL-MCNC: 7.1 G/DL (ref 6.4–8.2)
RBC # BLD AUTO: 4.74 M/UL (ref 4.1–5.7)
SERVICE CMNT-IMP: ABNORMAL
SODIUM SERPL-SCNC: 135 MMOL/L (ref 136–145)
WBC # BLD AUTO: 14 K/UL (ref 4.1–11.1)

## 2018-11-02 PROCEDURE — 74011250637 HC RX REV CODE- 250/637: Performed by: NURSE PRACTITIONER

## 2018-11-02 PROCEDURE — 83735 ASSAY OF MAGNESIUM: CPT | Performed by: INTERNAL MEDICINE

## 2018-11-02 PROCEDURE — 65660000000 HC RM CCU STEPDOWN

## 2018-11-02 PROCEDURE — 85025 COMPLETE CBC W/AUTO DIFF WBC: CPT | Performed by: INTERNAL MEDICINE

## 2018-11-02 PROCEDURE — 97530 THERAPEUTIC ACTIVITIES: CPT

## 2018-11-02 PROCEDURE — 97535 SELF CARE MNGMENT TRAINING: CPT | Performed by: OCCUPATIONAL THERAPIST

## 2018-11-02 PROCEDURE — G8988 SELF CARE GOAL STATUS: HCPCS | Performed by: OCCUPATIONAL THERAPIST

## 2018-11-02 PROCEDURE — 36415 COLL VENOUS BLD VENIPUNCTURE: CPT | Performed by: INTERNAL MEDICINE

## 2018-11-02 PROCEDURE — 74011250637 HC RX REV CODE- 250/637: Performed by: INTERNAL MEDICINE

## 2018-11-02 PROCEDURE — 82962 GLUCOSE BLOOD TEST: CPT

## 2018-11-02 PROCEDURE — G8987 SELF CARE CURRENT STATUS: HCPCS | Performed by: OCCUPATIONAL THERAPIST

## 2018-11-02 PROCEDURE — 97530 THERAPEUTIC ACTIVITIES: CPT | Performed by: OCCUPATIONAL THERAPIST

## 2018-11-02 PROCEDURE — 97165 OT EVAL LOW COMPLEX 30 MIN: CPT | Performed by: OCCUPATIONAL THERAPIST

## 2018-11-02 PROCEDURE — 97116 GAIT TRAINING THERAPY: CPT

## 2018-11-02 PROCEDURE — 77010033678 HC OXYGEN DAILY

## 2018-11-02 PROCEDURE — 80053 COMPREHEN METABOLIC PANEL: CPT | Performed by: INTERNAL MEDICINE

## 2018-11-02 PROCEDURE — 74011636637 HC RX REV CODE- 636/637: Performed by: INTERNAL MEDICINE

## 2018-11-02 PROCEDURE — 94760 N-INVAS EAR/PLS OXIMETRY 1: CPT

## 2018-11-02 RX ORDER — METOPROLOL TARTRATE 25 MG/1
12.5 TABLET, FILM COATED ORAL EVERY 12 HOURS
Status: DISCONTINUED | OUTPATIENT
Start: 2018-11-02 | End: 2018-11-04 | Stop reason: HOSPADM

## 2018-11-02 RX ADMIN — INSULIN LISPRO 3 UNITS: 100 INJECTION, SOLUTION INTRAVENOUS; SUBCUTANEOUS at 17:17

## 2018-11-02 RX ADMIN — Medication 10 ML: at 05:19

## 2018-11-02 RX ADMIN — PRAVASTATIN SODIUM 80 MG: 40 TABLET ORAL at 21:52

## 2018-11-02 RX ADMIN — Medication 10 ML: at 21:50

## 2018-11-02 RX ADMIN — INSULIN LISPRO 2 UNITS: 100 INJECTION, SOLUTION INTRAVENOUS; SUBCUTANEOUS at 21:51

## 2018-11-02 RX ADMIN — INSULIN LISPRO 4 UNITS: 100 INJECTION, SOLUTION INTRAVENOUS; SUBCUTANEOUS at 11:51

## 2018-11-02 RX ADMIN — INSULIN LISPRO 3 UNITS: 100 INJECTION, SOLUTION INTRAVENOUS; SUBCUTANEOUS at 08:32

## 2018-11-02 RX ADMIN — Medication 10 ML: at 14:54

## 2018-11-02 RX ADMIN — METOPROLOL TARTRATE 12.5 MG: 25 TABLET ORAL at 21:52

## 2018-11-02 RX ADMIN — Medication 10 ML: at 21:51

## 2018-11-02 RX ADMIN — METOPROLOL TARTRATE 12.5 MG: 25 TABLET ORAL at 09:56

## 2018-11-02 RX ADMIN — APIXABAN 5 MG: 5 TABLET, FILM COATED ORAL at 08:32

## 2018-11-02 RX ADMIN — UMECLIDINIUM BROMIDE AND VILANTEROL TRIFENATATE 1 PUFF: 62.5; 25 POWDER RESPIRATORY (INHALATION) at 08:33

## 2018-11-02 RX ADMIN — APIXABAN 5 MG: 5 TABLET, FILM COATED ORAL at 21:52

## 2018-11-02 NOTE — PROGRESS NOTES
Hospitalist Progress Note NAME: Glen Gotti :  1950 MRN:  [id number] Assessment / Plan: CVA:  vision loss, CT shows Right parietal and occipital subacute vs acute CVA,   MRI  Shows multiple embolic CVA, ECHO WNL, c/w Eliquis, Neurology input appreciated HTN: hold ACE, use labetalol prn allowing permissive HTN. Increased Troponin: c/w Eliquis, Cardiology in the case,  by definition NSTEMI. DM type 2 with hyperglycemia: hold metformin for now, c/w SSI, HbA1C 9.3 Leukocytosis ua/xrays ngt: no clear signs of infection, U/A and CXR are negative, may be reactive, no signs of cellulitis, slowly trending down Tobacco: counseled, c/w nicotine patch Morbid  obesity nutrition referral  BMI 44.12 Asthma/COPD - resume meds Surrogate Decision Maker: [facility] [phone] Baseline: ambulatory Code status: Full Prophylaxis: Eliquis Recommended Disposition: LTC Patient lives by himself has no relatives and now has severely impaired vision, may need LTC after the acute period of rehab Consultants to provide final recommendations Subjective: Chief Complaint / Reason for Physician Visit \"My vision is still the same\". Discussed with RN events overnight. Review of Systems: 
Symptom Y/N Comments  Symptom Y/N Comments Fever/Chills    Chest Pain Poor Appetite    Edema Cough    Abdominal Pain Sputum    Joint Pain SOB/CONTRERAS    Pruritis/Rash Nausea/vomit    Tolerating PT/OT Diarrhea    Tolerating Diet y Constipation    Other y Samuels vision Could NOT obtain due to:   
 
Objective: VITALS:  
Last 24hrs VS reviewed since prior progress note. Most recent are: 
Patient Vitals for the past 24 hrs: 
 Temp Pulse Resp BP SpO2  
18 0715 97.4 °F (36.3 °C) 77 18 126/52 96 % 18 0413 97.4 °F (36.3 °C) 74 16 139/64 95 % 18 2326 97.5 °F (36.4 °C) 73 16 152/77 94 % 18 97.4 °F (36.3 °C) 74 16 130/55 93 % 11/01/18 1420 97.4 °F (36.3 °C) 75 18 138/70 95 % 11/01/18 1104 98 °F (36.7 °C) 81 18 141/70 95 % Intake/Output Summary (Last 24 hours) at 11/2/2018 8398 Last data filed at 11/2/2018 3125 Gross per 24 hour Intake 1340 ml Output 1475 ml Net -135 ml PHYSICAL EXAM: 
General: WD, WN. Alert, cooperative, no acute distress   
EENT:  EOMI. Anicteric sclerae. MMM Resp:  Coarse BS, rhonchi CV:  Regular  rhythm,  No edema GI:  Soft, Non distended, Non tender.  +Bowel sounds Neurologic:  Alert and oriented X 3, normal speech, Psych:   Fair  insight. Not anxious nor agitated Skin:  No rashes. No jaundice Reviewed most current lab test results and cultures  YES Reviewed most current radiology test results   YES Review and summation of old records today    NO Reviewed patient's current orders and MAR    YES 
PMH/SH reviewed - no change compared to H&P 
________________________________________________________________________ Care Plan discussed with: 
  Comments Patient y Family RN y   
Care Manager Consultant Multidiciplinary team rounds were held today with , nursing, pharmacist and clinical coordinator. Patient's plan of care was discussed; medications were reviewed and discharge planning was addressed. ________________________________________________________________________ Total NON critical care TIME:  20   Minutes Total CRITICAL CARE TIME Spent:   Minutes non procedure based Comments >50% of visit spent in counseling and coordination of care y   
________________________________________________________________________ Cr Bustillos MD  
 
Procedures: see electronic medical records for all procedures/Xrays and details which were not copied into this note but were reviewed prior to creation of Plan. LABS: 
I reviewed today's most current labs and imaging studies. Pertinent labs include: 
Recent Labs 11/02/18 5594 11/01/18 
0351 10/31/18 
0159 WBC 14.0* 15.2* 16.9* HGB 12.8 13.2 12.7 HCT 39.7 41.5 38.6  210 186 Recent Labs 11/02/18 
8819 11/01/18 
0351 10/31/18 
0159 10/30/18 
1301 * 134* 134* 134* K 4.3 4.3 4.2 4.2  102 101 101 CO2 27 25 29 28 * 167* 176* 164* BUN 12 15 16 19 CREA 0.95 1.14 0.90 1.01  
CA 8.5 8.5 8.3* 8.2* MG 2.1 2.2  --   --   
ALB 3.5 3.6 3.5 3.6 TBILI 0.4 0.5 0.5 0.4 SGOT 19 20 25 21 ALT 27 31 32 31 INR  --   --   --  1.2* Signed: Yves Moses MD

## 2018-11-02 NOTE — PROGRESS NOTES
CM met with pt and discussed therapy's recommendation for inpt rehab and pt was in agreement. Pt verbalized he wants to go to [address] [place] [address]. [facility] form completed and referral sent via [employer]. Pt requested this CM speak with his friend, Puneet Pereyra ([phone]) and this CM called her. Informed her of the plan of pt going to rehab at discharge. Pt's friend stated pt was about to put a deposit on an apt and she wasn't sure if he should do it. CM encouraged her to speak with pt and pt's physician. Juve Lyons, [address] [place], [place] [id number]

## 2018-11-02 NOTE — PROGRESS NOTES
Problem: Mobility Impaired (Adult and Pediatric) Goal: *Acute Goals and Plan of Care (Insert Text) Physical Therapy Goals Initiated 10/31/2018 1. Patient will move from supine to sit and sit to supine  and scoot up and down in bed with independence within 7 day(s). 2.  Patient will transfer from bed to chair and chair to bed with independence using the least restrictive device within 7 day(s). 3.  Patient will perform sit to stand with independence within 7 day(s). 4.  Patient will ambulate with modified independence for 200 feet with the least restrictive device within 7 day(s). 5.  Patient will ascend/descend 4 stairs with handrail(s) with supervision/set-up within 7 day(s). physical Therapy TREATMENT Patient: Art Richmond (30 y.o. male) Date: 11/2/2018 Diagnosis: Stroke (cerebrum) ([facility] TIA (transient ischemic attack) Stroke (cerebrum) ([facility] Precautions:  Fall Chart, physical therapy assessment, plan of care and goals were reviewed. ASSESSMENT: 
Pt received supine in bed and agreeable to PT intervention. Pt cleared by nursing for mobility. VSS on 2 L/min O2. Pt with progress towards therapy goals this date and good/fair tolerance for activity overall. Bed mobility performed with SBA/CGA. Sit<>stand transfers performed with CGA. He participated in ARTHUR balance test and scored 27/52, indicating moderate fall risk. Ambulated 20 ft in room (to/from the bathroom) with min/CGA x 1 and pt reaching out with BUE for support (likely due to visual impairments). Exhibits slow gait speed, increased trunk sway, and widened JUAN throughout gait training. Pt needs constant verbal, visual, and tactile cueing during all mobility due to visual impairments, for safety and to improve independence. Pt with good reception towards all education and cueing this date. He was left sitting in bedside chair with all needs met and RN present following therapy session.  Pt will require inpatient rehab to improve functional mobility and independence and address visual impairments prior to returning home. Pt can tolerate 3 hours of therapy/day and remains motivated to participate in therapy. Recommend gait training in hallway during next therapy session to address patient's visual impairments and abilities in busier environments with open spaces. Progression toward goals: 
[]    Improving appropriately and progressing toward goals [x]    Improving slowly and progressing toward goals 
[]    Not making progress toward goals and plan of care will be adjusted PLAN: 
Patient continues to benefit from skilled intervention to address the above impairments. Continue treatment per established plan of care. Discharge Recommendations:  Inpatient Rehab Further Equipment Recommendations for Discharge:  TBD by rehab SUBJECTIVE:  
Patient stated It looks like a parking lot.  OBJECTIVE DATA SUMMARY:  
Critical Behavior: 
Neurologic State: Alert Orientation Level: Oriented to person, Oriented to place, Oriented to situation, Disoriented to time Cognition: Appropriate decision making, Appropriate for age attention/concentration, Appropriate safety awareness Safety/Judgement: Awareness of environment, Decreased insight into deficits Functional Mobility Training: 
Bed Mobility: 
  
Supine to Sit: Stand-by assistance Scooting: Contact guard assistance Transfers: 
Sit to Stand: Contact guard assistance Stand to Sit: Contact guard assistance Balance: 
Sitting: Intact Standing: Impaired Standing - Static: Good Standing - Dynamic : FairAmbulation/Gait Training: 
Distance (ft): 20 Feet (ft) Assistive Device: Gait belt Ambulation - Level of Assistance: Contact guard assistance;Assist x1;Additional time Gait Abnormalities: Decreased step clearance;Trunk sway increased Base of Support: Widened Speed/Marcia: Pace decreased (<100 feet/min); Shuffled Neuro Re-Education: 
Multimodal cueing during all activity due to visual impairments Functional Measure: 
Lulu Ocasio Balance Test: 
 
Sitting to Standin Standing Unsupported: 3 Sitting with Back Unsupported: 4 Standing to Sittin Transfers: 1 Standing Unsupported with Eyes Closed: 4 Standing Unsupported with Feet Together: 2 Reach Forward with Outstretched Arm: 1  Object: 3 Turn to Look Over Shoulders: 3 Turn 360 Degrees: 2 Alternate Foot on Step/Stool: 0 Standing Unsupported One Foot in Front: 0 Stand on One Le Total: 27 
 
 
 
56=Maximum possible score;  
0-20=High fall risk 21-40=Moderate fall risk 41-56=Low fall risk Reaves Balance Test and G-code impairment scale: 
Percentage of Impairment CH 
 
0% 
 CI 
 
1-19% CJ 
 
20-39% CK 
 
40-59% CL 
 
60-79% CM 
 
80-99% CN  
 
100% Lulu Payanons Score 0-56 56 45-55 34-44 23-33 12-22 1-11 0 Pain: 
Pain Scale 1: Numeric (0 - 10) Pain Intensity 1: 0 Activity Tolerance:  
Fair/good - no pain complaints; VSS on 2 L/min O2 Please refer to the flowsheet for vital signs taken during this treatment. After treatment:  
[x]    Patient left in no apparent distress sitting up in chair 
[]    Patient left in no apparent distress in bed 
[x]    Call bell left within reach [x]    Nursing notified 
[x]    Caregiver present 
[]    Bed alarm activated COMMUNICATION/COLLABORATION:  
The patients plan of care was discussed with: Physical Therapist, Occupational Therapist and Registered Nurse Jacklyn Veloz PT, DPT Time Calculation: 33 mins

## 2018-11-02 NOTE — PROGRESS NOTES
Chief Complaint: blindness Sitting in chair no distress. No new complaints Discussed outpatient follow up and therapy. Encouraged to lose weight. Assesment and Plan 1. Right PCA stroke Continue eliquis 2. Left homonymous hemianopsia Occupational therapy 3. Diabetes Continue insulin 
  
4. Tobacco 
Advised on the dangers of tobacco abuse Advised on the benefits of discontinuation  
  
5. Severe obesity 6. Afib Appreciate cardiology help 7. B12 deficiency Weekly b12 Allergies Patient has no known allergies. Medications Current Facility-Administered Medications Medication Dose Route Frequency  metoprolol tartrate (LOPRESSOR) tablet 12.5 mg  12.5 mg Oral Q12H  
 apixaban (ELIQUIS) tablet 5 mg  5 mg Oral Q12H  
 sodium chloride (NS) flush 5-10 mL  5-10 mL IntraVENous Q8H  
 sodium chloride (NS) flush 5-10 mL  5-10 mL IntraVENous PRN  
 sodium chloride (NS) flush 5-10 mL  5-10 mL IntraVENous Q8H  
 sodium chloride (NS) flush 5-10 mL  5-10 mL IntraVENous PRN  
 acetaminophen (TYLENOL) suppository 650 mg  650 mg Rectal Q4H PRN  
 sodium chloride (NS) flush 5-10 mL  5-10 mL IntraVENous Q8H  
 sodium chloride (NS) flush 5-10 mL  5-10 mL IntraVENous PRN  
 acetaminophen (TYLENOL) tablet 650 mg  650 mg Oral Q4H PRN Or  
 acetaminophen (TYLENOL) solution 650 mg  650 mg Per NG tube Q4H PRN Or  
 acetaminophen (TYLENOL) suppository 650 mg  650 mg Rectal Q4H PRN  
 bisacodyl (DULCOLAX) tablet 5 mg  5 mg Oral DAILY PRN  polyethylene glycol (MIRALAX) packet 17 g  17 g Oral DAILY  albuterol (PROVENTIL HFA, VENTOLIN HFA, PROAIR HFA) inhaler 2 Puff  2 Puff Inhalation Q4H PRN  
 umeclidinium-vilanterol (ANORO ELLIPTA) 62.5 mcg- 25 mcg/inhalation  1 Puff Inhalation DAILY  labetalol (NORMODYNE;TRANDATE) injection 5 mg  5 mg IntraVENous Q10MIN PRN  
 nicotine (NICODERM CQ) 21 mg/24 hr patch 1 Patch  1 Patch TransDERmal DAILY  glucose chewable tablet 16 g  4 Tab Oral PRN  
 dextrose (D50W) injection syrg 12.5-25 g  25-50 mL IntraVENous PRN  
 glucagon (GLUCAGEN) injection 1 mg  1 mg IntraMUSCular PRN  
 insulin lispro (HUMALOG) injection   SubCUTAneous AC&HS  pravastatin (PRAVACHOL) tablet 80 mg  80 mg Oral QHS Medical History Past Medical History:  
Diagnosis Date  Diabetes mellitus type 2, controlled ([facility] 10/31/2018  PAF (paroxysmal atrial fibrillation) ([facility] 10/31/2018  Tobacco abuse 10/30/2018 Review of Systems Constitutional: Negative for chills and fever. HENT: Negative for ear pain. Eyes: Negative for pain and discharge. Respiratory: Negative for cough and hemoptysis. Cardiovascular: Positive for leg swelling. Negative for chest pain and claudication. Gastrointestinal: Negative for constipation and diarrhea. Genitourinary: Negative for flank pain and hematuria. Musculoskeletal: Positive for back pain and myalgias. Skin: Negative for itching and rash. Neurological: Negative for headaches. Endo/Heme/Allergies: Negative for environmental allergies. Does not bruise/bleed easily. Psychiatric/Behavioral: Negative for depression and hallucinations. Exam: 
 
Visit Vitals /60 Pulse 73 Temp 97.6 °F (36.4 °C) Resp 18 Ht 6' 3\" (1.905 m) Wt (!) 357 lb 12.9 oz (162.3 kg) SpO2 92% BMI 44.72 kg/m² General: Morbidly obese. Pa Head: Normocephalic, atraumatic, anicteric sclera Neck Normal ROM, No thyromegally Lungs:  Clear to auscultation bilaterally, No wheezes or rubs Cardiac: Regular rate and rhythm with no murmurs. Abd: Bowel sounds were audible. No tenderness on palpation Ext: Distal lower extremity edema Skin: Supple no rash Imaging CT Results (most recent): 
Results from Hospital Encounter encounter on 10/30/18 CTA HEAD NECK W CONT Narrative CLINICAL HISTORY: Sudden onset vision loss EXAMINATION:  CT ANGIOGRAPHY HEAD AND NECK COMPARISON: CT head 10/30/2018 TECHNIQUE:  Following the uneventful administration of iodinated contrast 
material, axial CT angiography of the head and neck was performed. Delayed axial 
images through the head were also obtained. Coronal and sagittal reconstructions 
were obtained. Manual postprocessing of images was performed. 3-D  Sagittal 
maximal intensity projection images were obtained. 3-D Coronal maximal 
intensity projections were obtained. CT dose reduction was achieved through use 
of a standardized protocol tailored for this examination and automatic exposure 
control for dose modulation. FINDINGS: 
 
Delayed contrast-enhanced head CT: The ventricles are midline without hydrocephalus. There is no acute intra or 
extra-axial hemorrhage. Mild bilateral subcortical and periventricular areas of 
patchy low attenuation is nonspecific but likely related to changes of chronic 
small vessel ischemic disease. Moderate right parieto-occipital cortical and 
subcortical hypoattenuation likely representing acute ischemic injury. Probable 
small focal cortical hypoattenuation in the left occipital lobe as well. No 
evidence for hemorrhagic conversion. The basal cisterns are clear. The 
paranasal sinuses are clear. CTA NECK: 
 
Great vessels: Normal arch anatomy with the origins patent. Right subclavian artery: Patent Left subclavian artery: Patent Right common carotid artery: Patent Left common carotid artery: Patent Cervical right internal carotid artery: Patent with no significant stenosis by NASCET criteria. Cervical left internal carotid artery: Patent with mild proximal atherosclerosis 
but no significant stenosis by NASCET criteria. Right vertebral artery: Patent Left vertebral artery: Patent Reversal of the normal cervical lordosis with moderate cervical spondylosis Mild emphysema CTA HEAD: 
 
Right cavernous internal carotid artery: Patent Left cavernous internal carotid artery: Patent Anterior cerebral arteries: Patent Anterior communicating artery: Patent Right middle cerebral artery: Patent Left middle cerebral artery: Patent Posterior communicating arteries: Left is hypoplastic. Right is patent with 3 mm 
infundibulum versus aneurysm at the origin Posterior cerebral arteries: Moderate distal right-sided atherosclerosis with 
distal right PCA occlusion. The left is patent and unremarkable. Basilar artery: Patent Distal vertebral arteries: Patent Impression Impression: Moderate acute right parieto-occipital infarction with thromboembolic occlusion 
of a tiny distal right posterior cerebral artery branch. No acute large vessel 
arterial occlusion. Questionable small focal cortical hypoattenuation in the 
left occipital lobe. 3 mm right posterior communicating artery probable infundibulum, less likely 
aneurysm MRI Results (most recent): 
Results from Hospital Encounter encounter on 10/30/18 MRI BRAIN WO CONT Narrative EXAM:  MRA BRAIN WO CONT, MRI BRAIN WO CONT, MRA NECK WO CONT INDICATION:  Patient presents with acute onset starting at 0 8:30 of loss of 
vision in both eyes after leaving chiropractor yesterday. TECHNIQUE: Sagittal T1, axial FLAIR, T2,T1 and gradient echo images as well as 
coronal T2 weighted images and axial diffusion weighted images of the head were 
obtained. COMPARISON:  CTA head and neck 10/30/18 FINDINGS: 
Mild respiratory movement artifact limits fine detail. There are multiple small areas of restricted diffusion and associated T2 
hyperintensity in the right greater than left cerebral hemispheres. This 
involves all lobes. Corresponding to decreased attenuation on CT, there is a 
confluent area of restricted diffusion involving the right junction parietal, 
temporal and occipital lobes.  Also small foci of restricted diffusion and T2 
 hyperintensity in both cerebellar hemispheres. This appearance is most consistent with embolic disease to the brain. Vasculitis 
is less likely. No evidence of acute intracranial hemorrhage, mass or abnormal extra-axial fluid 
collections. Flow voids are present in vertebral basilar and carotid artery 
systems. Structures: Base including paranasal sinuses are unremarkable. Impression IMPRESSION: 
1. Diffuse distribution of multifocal cortical infarction involving all lobes of 
both cerebral hemispheres and cerebellum and pattern most suggestive of emboli. Vasculitis less likely. 2. Area of decreased attenuation right posterior cerebral hemisphere corresponds 
to confluence of acute infarction involving right parietal, posterior temporal 
and occipital lobes. EXAM:  MRA BRAIN WO CONT, MRA NECK WO CONT INDICATION:  Patient presented with acute onset loss of vision both eyes at 0 
8:30 after leaving chiropractor. TECHNIQUE: Axial 3-D time-of-flight MR angiography was performed of the cranial 
base and proximal intracranial vessels. 3-D time-of-flight MR angiography was 
performed from aortic arch to the skull base. MIP reconstructions were performed 
of both data sets. COMPARISON: CTA head and neck 10/30/18 FINDINGS: 
Due to respiratory and other motion carotid bifurcations are not well 
delineated. This was also somewhat true on the CTA and part related to body 
habitus. Therefore NASCET criteria cannot be applied. Some degree of stenosis in 
the proximal right internal carotid arteries possible, however probably 
artifact. Remaining cervical internal carotid arteries are symmetric and 
unremarkable. Carotid siphons are normal. Tiny right distal internal carotid 
infundibulum versus aneurysm again noted. No stenosis carotid siphons. Symmetric flow in middle and anterior cerebral 
arteries. Left vertebral artery is dominant. The basilar artery is normal and supplies both posterior cerebral arteries. IMPRESSION: 
1. Combination body habitus and respiratory artifact limits evaluation carotid 
bifurcations. Duplex Doppler ultrasound carotid bifurcations if further 
evaluation needed. 2. Otherwise essentially normal MRA neck and head arteries. Lab Review Lab Results Component Value Date/Time WBC 14.0 (H) 11/02/2018 05:24 AM  
 HCT 39.7 11/02/2018 05:24 AM  
 HGB 12.8 11/02/2018 05:24 AM  
 PLATELET 676 64/61/1695 05:24 AM  
 
 
Lab Results Component Value Date/Time Sodium 135 (L) 11/02/2018 05:24 AM  
 Potassium 4.3 11/02/2018 05:24 AM  
 Chloride 102 11/02/2018 05:24 AM  
 CO2 27 11/02/2018 05:24 AM  
 Glucose 184 (H) 11/02/2018 05:24 AM  
 BUN 12 11/02/2018 05:24 AM  
 Creatinine 0.95 11/02/2018 05:24 AM  
 Calcium 8.5 11/02/2018 05:24 AM  
 
 
 
Lab Results Component Value Date/Time Hemoglobin A1c 9.3 (H) 10/31/2018 01:59 AM  
  
 
Lab Results Component Value Date/Time Vitamin B12 291 10/30/2018 02:17 PM  
 
 
Lab Results Component Value Date/Time  Cholesterol, total 151 10/31/2018 01:59 AM  
 HDL Cholesterol 28 10/31/2018 01:59 AM  
 LDL, calculated 78.4 10/31/2018 01:59 AM  
 VLDL, calculated 44.6 10/31/2018 01:59 AM  
 Triglyceride 223 (H) 10/31/2018 01:59 AM  
 CHOL/HDL Ratio 5.4 (H) 10/31/2018 01:59 AM

## 2018-11-02 NOTE — PROGRESS NOTES
[address] [place] [address]  [phone] Cardiology Progress Note 
 
11/2/2018 12:00PM 
 
Admit Date: 10/30/2018 Admit Diagnosis:  
Stroke (cerebrum) ([address] TIA (transient ischemic attack) Subjective:  
 
Wei More has no complaints. MRI yesterday showing multiple areas of embolic CVA. Tolerating Eliquis. Visit Vitals /60 Pulse 73 Temp 97.6 °F (36.4 °C) Resp 18 Ht 6' 3\" (1.905 m) Wt (!) 162.3 kg (357 lb 12.9 oz) SpO2 92% BMI 44.72 kg/m² Current Facility-Administered Medications Medication Dose Route Frequency  metoprolol tartrate (LOPRESSOR) tablet 12.5 mg  12.5 mg Oral Q12H  
 apixaban (ELIQUIS) tablet 5 mg  5 mg Oral Q12H  
 sodium chloride (NS) flush 5-10 mL  5-10 mL IntraVENous Q8H  
 sodium chloride (NS) flush 5-10 mL  5-10 mL IntraVENous PRN  
 sodium chloride (NS) flush 5-10 mL  5-10 mL IntraVENous Q8H  
 sodium chloride (NS) flush 5-10 mL  5-10 mL IntraVENous PRN  
 acetaminophen (TYLENOL) suppository 650 mg  650 mg Rectal Q4H PRN  
 sodium chloride (NS) flush 5-10 mL  5-10 mL IntraVENous Q8H  
 sodium chloride (NS) flush 5-10 mL  5-10 mL IntraVENous PRN  
 acetaminophen (TYLENOL) tablet 650 mg  650 mg Oral Q4H PRN Or  
 acetaminophen (TYLENOL) solution 650 mg  650 mg Per NG tube Q4H PRN Or  
 acetaminophen (TYLENOL) suppository 650 mg  650 mg Rectal Q4H PRN  
 bisacodyl (DULCOLAX) tablet 5 mg  5 mg Oral DAILY PRN  polyethylene glycol (MIRALAX) packet 17 g  17 g Oral DAILY  albuterol (PROVENTIL HFA, VENTOLIN HFA, PROAIR HFA) inhaler 2 Puff  2 Puff Inhalation Q4H PRN  
 umeclidinium-vilanterol (ANORO ELLIPTA) 62.5 mcg- 25 mcg/inhalation  1 Puff Inhalation DAILY  labetalol (NORMODYNE;TRANDATE) injection 5 mg  5 mg IntraVENous Q10MIN PRN  
 nicotine (NICODERM CQ) 21 mg/24 hr patch 1 Patch  1 Patch TransDERmal DAILY  glucose chewable tablet 16 g  4 Tab Oral PRN  
  dextrose (D50W) injection syrg 12.5-25 g  25-50 mL IntraVENous PRN  
 glucagon (GLUCAGEN) injection 1 mg  1 mg IntraMUSCular PRN  
 insulin lispro (HUMALOG) injection   SubCUTAneous AC&HS  pravastatin (PRAVACHOL) tablet 80 mg  80 mg Oral QHS Objective:  
  
Physical Exam: 
General Appearance:  morbidly obese  male in no acute distress Chest:   diminished, wheezing Cardiovascular:  Regular rate and rhythm, no murmur.  
Abdomen:   Soft, non-tender, bowel sounds are active.  
Extremities: chronic venous stasis, +1 shaneka LE edema Skin:  Warm and dry.  
 
Data Review:  
Recent Labs 11/02/18 
8735 11/01/18 
0351 10/31/18 
0159 WBC 14.0* 15.2* 16.9* HGB 12.8 13.2 12.7 HCT 39.7 41.5 38.6  210 186 Recent Labs 11/02/18 
6220 11/01/18 
0351 10/31/18 
0159 * 134* 134* K 4.3 4.3 4.2  102 101 CO2 27 25 29 * 167* 176* BUN 12 15 16 CREA 0.95 1.14 0.90  
CA 8.5 8.5 8.3*  
MG 2.1 2.2  --   
ALB 3.5 3.6 3.5 TBILI 0.4 0.5 0.5 SGOT 19 20 25 ALT 27 31 32 Recent Labs 10/31/18 
3639 10/31/18 
0159 TROIQ 1.17* 1.48* Intake/Output Summary (Last 24 hours) at 11/2/2018 1553 Last data filed at 11/2/2018 1447 Gross per 24 hour Intake 1280 ml Output 1775 ml Net -495 ml Telemetry: SR 
 
 
Assessment:  
 
Principal Problem: 
  Stroke (cerebrum) ([facility] (10/30/2018) Active Problems: 
  TIA (transient ischemic attack) (10/30/2018) Elevated troponin (10/30/2018) Tobacco abuse (10/30/2018) PAF (paroxysmal atrial fibrillation) ([facility] (10/31/2018) Diabetes mellitus type 2, controlled ([facility] (10/31/2018) Plan:  
 
Elevated troponin:  
Mild elevation likely r/t CVA Continues on statin. ASA discontinued to decrease the risk of bleeding, with the initiation of DOAC Nuclear stress Ant Serrano in future. The patient has multiple risk factors.  
Continue on BB, statin 
  
PAF: 
 Short run on telemetry overnight 10/31/2018suspect as cause for CVA. Continue Eliquis. No need for SHERIDAN as it is suspected that embolic CVA r/t NELY thrombus- even if no thrombus seen on SHERIDAN, it could be due to complete migration Sleep apnea? ? Plan for outpt evaluation.  
  
CVA: 
Continue on eliquis, statin-  
  
 
Taryn Lang [facility] Cardiology Patient seen and examined by me with nurse practitioner Taryn Lang. I personally performed all components of the history, physical, and medical decision making and agree with the assessment and plan with minor modifications as noted. The patient is tolerating Eliquis well. Confirmed that starting Eliquis at this point is okay with Dr. Vielka Murguia of neurology. Follow-up with me as an outpatient in a couple weeks and we will plan on pursuing outpatient stress test for asymptomatic troponin elevation which is very common CVA patients. With no chest pain or ischemic EKG changes, this is not consistent with an ACS. Thanks for the consult. We will sign off for now. Please call if we can assist again during this hospitalization. Added instruction to follow-up with me in 2 weeks to the discharge instructions.

## 2018-11-02 NOTE — PROGRESS NOTES
Chief Complaint: blindness No changes in homonymous hemianopia. Discussed results with the patient and prospects of recovery and treatment and the role of occupational therapy. He has just moved in to a senior living arrangement and is single and recently retired. Assesment and Plan 1. Right PCA stroke Continue eliquis 2. Left homonymous hemianopsia Occupational therapy 3. Diabetes Continue insulin 
  
4. Tobacco 
Advised on the dangers of tobacco abuse Advised on the benefits of discontinuation  
  
5. Severe obesity 6. Afib Appreciate cardiology help Allergies Patient has no known allergies. Medications Current Facility-Administered Medications Medication Dose Route Frequency  apixaban (ELIQUIS) tablet 5 mg  5 mg Oral Q12H  
 sodium chloride (NS) flush 5-10 mL  5-10 mL IntraVENous Q8H  
 sodium chloride (NS) flush 5-10 mL  5-10 mL IntraVENous PRN  
 sodium chloride (NS) flush 5-10 mL  5-10 mL IntraVENous Q8H  
 sodium chloride (NS) flush 5-10 mL  5-10 mL IntraVENous PRN  
 acetaminophen (TYLENOL) suppository 650 mg  650 mg Rectal Q4H PRN  
 clopidogrel (PLAVIX) tablet 75 mg  75 mg Oral DAILY  sodium chloride (NS) flush 5-10 mL  5-10 mL IntraVENous Q8H  
 sodium chloride (NS) flush 5-10 mL  5-10 mL IntraVENous PRN  
 acetaminophen (TYLENOL) tablet 650 mg  650 mg Oral Q4H PRN Or  
 acetaminophen (TYLENOL) solution 650 mg  650 mg Per NG tube Q4H PRN Or  
 acetaminophen (TYLENOL) suppository 650 mg  650 mg Rectal Q4H PRN  
 bisacodyl (DULCOLAX) tablet 5 mg  5 mg Oral DAILY PRN  polyethylene glycol (MIRALAX) packet 17 g  17 g Oral DAILY  albuterol (PROVENTIL HFA, VENTOLIN HFA, PROAIR HFA) inhaler 2 Puff  2 Puff Inhalation Q4H PRN  
 umeclidinium-vilanterol (ANORO ELLIPTA) 62.5 mcg- 25 mcg/inhalation  1 Puff Inhalation DAILY  labetalol (NORMODYNE;TRANDATE) injection 5 mg  5 mg IntraVENous Q10MIN PRN  
  nicotine (NICODERM CQ) 21 mg/24 hr patch 1 Patch  1 Patch TransDERmal DAILY  glucose chewable tablet 16 g  4 Tab Oral PRN  
 dextrose (D50W) injection syrg 12.5-25 g  25-50 mL IntraVENous PRN  
 glucagon (GLUCAGEN) injection 1 mg  1 mg IntraMUSCular PRN  
 insulin lispro (HUMALOG) injection   SubCUTAneous AC&HS  pravastatin (PRAVACHOL) tablet 80 mg  80 mg Oral QHS Medical History Past Medical History:  
Diagnosis Date  Diabetes mellitus type 2, controlled ([facility] 10/31/2018  PAF (paroxysmal atrial fibrillation) ([facility] 10/31/2018  Tobacco abuse 10/30/2018 Review of Systems Constitutional: Negative for chills and fever. HENT: Negative for ear pain. Eyes: Negative for pain and discharge. Respiratory: Negative for cough and hemoptysis. Cardiovascular: Positive for leg swelling. Negative for chest pain and claudication. Gastrointestinal: Negative for constipation and diarrhea. Genitourinary: Negative for flank pain and hematuria. Musculoskeletal: Positive for back pain and myalgias. Skin: Negative for itching and rash. Neurological: Negative for headaches. Endo/Heme/Allergies: Negative for environmental allergies. Does not bruise/bleed easily. Psychiatric/Behavioral: Negative for depression and hallucinations. Exam: 
 
Visit Vitals /55 Pulse 74 Temp 97.4 °F (36.3 °C) Resp 16 Ht 6' 3\" (1.905 m) Wt (!) 357 lb 12.9 oz (162.3 kg) SpO2 93% BMI 44.72 kg/m² General: Morbidly obese. Pa Head: Normocephalic, atraumatic, anicteric sclera Neck Normal ROM, No thyromegally Lungs:  Clear to auscultation bilaterally, No wheezes or rubs Cardiac: Regular rate and rhythm with no murmurs. Abd: Bowel sounds were audible. No tenderness on palpation Ext: Distal lower extremity edema Skin: Supple no rash  
  
NeurologicExam: 
Mental Status: Alert and oriented to person place and time Speech: Fluent no aphasia or dysarthria. Cranial Nerves:  II - XII Intact left homonymous heminaopisa Motor:  Full and symmetric strength of upper and lower proximal and distal muscles. Normal bulk and tone. Reflexes:   +1/4 over the proximal tendons of the upper and lower extremities. +0/4 over distal tendons. Sensory:   Symmetric distal reduction in sensory perception affecting all modalities. Tremor:   No tremor noted. Cerebellar:  Coordination intact. Neurovascular: No carotid bruits. No JVD Imaging CT Results (most recent): 
Results from Hospital Encounter encounter on 10/30/18 CTA HEAD NECK W CONT Narrative CLINICAL HISTORY: Sudden onset vision loss EXAMINATION:  CT ANGIOGRAPHY HEAD AND NECK COMPARISON: CT head 10/30/2018 TECHNIQUE:  Following the uneventful administration of iodinated contrast 
material, axial CT angiography of the head and neck was performed. Delayed axial 
images through the head were also obtained. Coronal and sagittal reconstructions 
were obtained. Manual postprocessing of images was performed. 3-D  Sagittal 
maximal intensity projection images were obtained. 3-D Coronal maximal 
intensity projections were obtained. CT dose reduction was achieved through use 
of a standardized protocol tailored for this examination and automatic exposure 
control for dose modulation. FINDINGS: 
 
Delayed contrast-enhanced head CT: The ventricles are midline without hydrocephalus. There is no acute intra or 
extra-axial hemorrhage. Mild bilateral subcortical and periventricular areas of 
patchy low attenuation is nonspecific but likely related to changes of chronic 
small vessel ischemic disease. Moderate right parieto-occipital cortical and 
subcortical hypoattenuation likely representing acute ischemic injury. Probable 
small focal cortical hypoattenuation in the left occipital lobe as well. No 
evidence for hemorrhagic conversion. The basal cisterns are clear. The 
paranasal sinuses are clear. CTA NECK: 
 
Great vessels: Normal arch anatomy with the origins patent. Right subclavian artery: Patent Left subclavian artery: Patent Right common carotid artery: Patent Left common carotid artery: Patent Cervical right internal carotid artery: Patent with no significant stenosis by NASCET criteria. Cervical left internal carotid artery: Patent with mild proximal atherosclerosis 
but no significant stenosis by NASCET criteria. Right vertebral artery: Patent Left vertebral artery: Patent Reversal of the normal cervical lordosis with moderate cervical spondylosis Mild emphysema CTA HEAD: 
 
Right cavernous internal carotid artery: Patent Left cavernous internal carotid artery: Patent Anterior cerebral arteries: Patent Anterior communicating artery: Patent Right middle cerebral artery: Patent Left middle cerebral artery: Patent Posterior communicating arteries: Left is hypoplastic. Right is patent with 3 mm 
infundibulum versus aneurysm at the origin Posterior cerebral arteries: Moderate distal right-sided atherosclerosis with 
distal right PCA occlusion. The left is patent and unremarkable. Basilar artery: Patent Distal vertebral arteries: Patent Impression Impression: Moderate acute right parieto-occipital infarction with thromboembolic occlusion 
of a tiny distal right posterior cerebral artery branch. No acute large vessel 
arterial occlusion. Questionable small focal cortical hypoattenuation in the 
left occipital lobe. 3 mm right posterior communicating artery probable infundibulum, less likely 
aneurysm MRI Results (most recent): 
Results from Hospital Encounter encounter on 10/30/18 MRI BRAIN WO CONT Narrative EXAM:  MRA BRAIN WO CONT, MRI BRAIN WO CONT, MRA NECK WO CONT INDICATION:  Patient presents with acute onset starting at 0 8:30 of loss of 
vision in both eyes after leaving chiropractor yesterday. TECHNIQUE: Sagittal T1, axial FLAIR, T2,T1 and gradient echo images as well as 
coronal T2 weighted images and axial diffusion weighted images of the head were 
obtained. COMPARISON:  CTA head and neck 10/30/18 FINDINGS: 
Mild respiratory movement artifact limits fine detail. There are multiple small areas of restricted diffusion and associated T2 
hyperintensity in the right greater than left cerebral hemispheres. This 
involves all lobes. Corresponding to decreased attenuation on CT, there is a 
confluent area of restricted diffusion involving the right junction parietal, 
temporal and occipital lobes. Also small foci of restricted diffusion and T2 
hyperintensity in both cerebellar hemispheres. This appearance is most consistent with embolic disease to the brain. Vasculitis 
is less likely. No evidence of acute intracranial hemorrhage, mass or abnormal extra-axial fluid 
collections. Flow voids are present in vertebral basilar and carotid artery 
systems. Structures: Base including paranasal sinuses are unremarkable. Impression IMPRESSION: 
1. Diffuse distribution of multifocal cortical infarction involving all lobes of 
both cerebral hemispheres and cerebellum and pattern most suggestive of emboli. Vasculitis less likely. 2. Area of decreased attenuation right posterior cerebral hemisphere corresponds 
to confluence of acute infarction involving right parietal, posterior temporal 
and occipital lobes. EXAM:  MRA BRAIN WO CONT, MRA NECK WO CONT INDICATION:  Patient presented with acute onset loss of vision both eyes at 0 
8:30 after leaving chiropractor. TECHNIQUE: Axial 3-D time-of-flight MR angiography was performed of the cranial 
base and proximal intracranial vessels. 3-D time-of-flight MR angiography was 
performed from aortic arch to the skull base. MIP reconstructions were performed 
of both data sets. COMPARISON: CTA head and neck 10/30/18 FINDINGS: 
 Due to respiratory and other motion carotid bifurcations are not well 
delineated. This was also somewhat true on the CTA and part related to body 
habitus. Therefore NASCET criteria cannot be applied. Some degree of stenosis in 
the proximal right internal carotid arteries possible, however probably 
artifact. Remaining cervical internal carotid arteries are symmetric and 
unremarkable. Carotid siphons are normal. Tiny right distal internal carotid 
infundibulum versus aneurysm again noted. No stenosis carotid siphons. Symmetric flow in middle and anterior cerebral 
arteries. Left vertebral artery is dominant. The basilar artery is normal and supplies 
both posterior cerebral arteries. IMPRESSION: 
1. Combination body habitus and respiratory artifact limits evaluation carotid 
bifurcations. Duplex Doppler ultrasound carotid bifurcations if further 
evaluation needed. 2. Otherwise essentially normal MRA neck and head arteries. Lab Review Lab Results Component Value Date/Time WBC 15.2 (H) 11/01/2018 03:51 AM  
 HCT 41.5 11/01/2018 03:51 AM  
 HGB 13.2 11/01/2018 03:51 AM  
 PLATELET 143 84/42/9801 03:51 AM  
 
 
Lab Results Component Value Date/Time Sodium 134 (L) 11/01/2018 03:51 AM  
 Potassium 4.3 11/01/2018 03:51 AM  
 Chloride 102 11/01/2018 03:51 AM  
 CO2 25 11/01/2018 03:51 AM  
 Glucose 167 (H) 11/01/2018 03:51 AM  
 BUN 15 11/01/2018 03:51 AM  
 Creatinine 1.14 11/01/2018 03:51 AM  
 Calcium 8.5 11/01/2018 03:51 AM  
 
 
No components found for: Toni Soriano No results found for: LEONCIO Lab Results Component Value Date/Time Hemoglobin A1c 9.3 (H) 10/31/2018 01:59 AM  
  
 
Lab Results Component Value Date/Time Vitamin B12 291 10/30/2018 02:17 PM  
 
 
No results found for: Zina AFNG Staff Lab Results Component Value Date/Time  Cholesterol, total 151 10/31/2018 01:59 AM  
 HDL Cholesterol 28 10/31/2018 01:59 AM  
 LDL, calculated 78.4 10/31/2018 01:59 AM  
 VLDL, calculated 44.6 10/31/2018 01:59 AM  
 Triglyceride 223 (H) 10/31/2018 01:59 AM  
 CHOL/HDL Ratio 5.4 (H) 10/31/2018 01:59 AM

## 2018-11-02 NOTE — PROGRESS NOTES
Problem: Self Care Deficits Care Plan (Adult) Goal: *Acute Goals and Plan of Care (Insert Text) Occupational Therapy Goals Initiated 11/2/2018 1. Patient will perform self-feeding with supervision/set-up, utilizing appropriate strategies within 7 day(s). 2.  Patient will perform grooming in standing with supervision/set-up within 7 day(s). 3.  Patient will perform upper body dressing and lower body dressing with minimal assistance/contact guard assist within 7 day(s). 4.  Patient will perform toilet transfers with supervision/set-up within 7 day(s). 5.  Patient will perform all aspects of toileting with supervision/set-up within 7 day(s). 6.  Patient will participate in upper extremity therapeutic exercise/activities with supervision/set-up for 10 minutes within 7 day(s). Occupational Therapy EVALUATION Patient: Rody Angel (32 y.o. male) Date: 11/2/2018 Primary Diagnosis: Stroke (cerebrum) ([facility] TIA (transient ischemic attack) Precautions: fall, impaired vision ASSESSMENT : 
Based on the objective data described below, the patient presents with impaired vision s/p CVA and balance deficits impairing independence and safety during adls and functional mobility. Pt is functioning below his independent baseline in adls/IADLs  and will benefit from intensive rehab at discharge. At this time pt is functioning at minimal to moderate assistance for adls and is CGA/Min A for functional mobility in his room, requiring significant verbal and physical cues due to vision loss and impaired balance. Recommend intensive inpatient rehab at discharge. . 
 
Patient will benefit from skilled intervention to address the above impairments. Patients rehabilitation potential is considered to be Good Factors which may influence rehabilitation potential include:  
[]             None noted []             Mental ability/status [x]             Medical condition []             Home/family situation and support systems []             Safety awareness []             Pain tolerance/management [x]             Other: new vision loss PLAN : 
Recommendations and Planned Interventions: 
[x]               Self Care Training                  [x]        Therapeutic Activities [x]               Functional Mobility Training    []        Cognitive Retraining 
[x]               Therapeutic Exercises           [x]        Endurance Activities [x]               Balance Training                   [x]        Neuromuscular Re-Education []               Visual/Perceptual Training     [x]   Home Safety Training 
[x]               Patient Education                 [x]        Family Training/Education []               Other (comment): Frequency/Duration: Patient will be followed by occupational therapy 4 times a week to address goals. Discharge Recommendations: Inpatient Rehab Further Equipment Recommendations for Discharge: tbd SUBJECTIVE:  
Patient stated I can see the tv outline.   (he could not see the board in his room--adjacent to TV\" OBJECTIVE DATA SUMMARY:  
HISTORY:  
Past Medical History:  
Diagnosis Date  Diabetes mellitus type 2, controlled ([facility] 10/31/2018  [facility]  Tobacco abuse 10/30/2018 History reviewed. No pertinent surgical history. Prior Level of Function/Environment/Context: Pt is retired and lives alone. He was independent in adls and IADLs including driving, shopping, etc.   
Occupations in which the patient is/was successful, what are the barriers preventing that success: medical condition. New vision loss Performance Patterns (routines, roles, habits, and rituals):  
Personal Interests and/or values:  
Expanded or extensive additional review of patient history: as above Home Situation Home Environment: Apartment # Steps to Enter: 0(to PT pt reports no steps to enter) One/Two Story Residence: One story Living Alone: Yes(pt reports to PT he does lives alone) Support Systems: (pt states to PT that he is alone, no help) Patient Expects to be Discharged to[de-identified] [id number] Current DME Used/Available at Home: None Hand dominance: RightEXAMINATION OF PERFORMANCE DEFICITS: 
Cognitive/Behavioral Status: 
Neurologic State: Alert Orientation Level: Disoriented to time Cognition: Follows commands(vision deficits impair safety) Perception: (impaired due to vision deficits ) Perseveration: No perseveration noted Safety/Judgement: Decreased awareness of environment; Fall prevention;Home safety; Insight into deficits(poor vision impairs safety) Skin: generally intact Edema: none observed. Hearing: Auditory Auditory Impairment: (pt reports intact) Vision/Perceptual:   
    
    
    
  
    
Acuity: Impaired near vision; Impaired far vision(can see shape of tv due to black outline, cannot read/see the board positioned adjacent to TV) Range of Motion: BUEs:   
AROM: Generally decreased, functional 
PROM: Generally decreased, functional 
  
  
  
  
  
  
Strength: BUEs:  
Strength: Within functional limits 
  
  strength is equal. 
No complaints of sided weakness Coordination: 
Coordination: Within functional limits Fine Motor Skills-Upper: Left Intact; Right Intact Gross Motor Skills-Upper: Left Intact; Right Intact Tone & Sensation: 
 
Tone: Normal 
Sensation: Intact(no complaints) Balance: 
Sitting: Intact Standing: Impaired Standing - Static: Good Standing - Dynamic : Fair Functional Mobility and Transfers for ADLs:Bed Mobility: 
Supine to Sit: Stand-by assistance Scooting: Contact guard assistance Transfers: 
Sit to Stand: Contact guard assistance Stand to Sit: Contact guard assistance Bathroom Mobility: Contact guard assistance Toilet Transfer : Contact guard assistance ADL Assessment: Feeding: Moderate assistance(will need strategy--clock for items on food tray, use of contrast surfaces) Oral Facial Hygiene/Grooming: Minimum assistance(set up in sitting-verbal cues ) Bathing: Moderate assistance Upper Body Dressing: Moderate assistance(for gown) Lower Body Dressing: Moderate assistance(unable to see feet to don socks) Toileting: Supervision(seated void) ADL Intervention and task modifications: Pt performed bed mobility, ambulation to the bathroom for toilet transfer and standing handwashing ,requiring cues for safety and for locating toilet, sink, bathroom door etc.   
 
  
 
  
 
  
 
  
 
  
 
  
 
Cognitive Retraining Safety/Judgement: Decreased awareness of environment; Fall prevention;Home safety; Insight into deficits(poor vision impairs safety) Therapeutic Exercise: 
Encouraged sitting up in chair provided this date Functional Measure: 
Barthel Index: 
 
Bathin Bladder: 10 Bowels: 10 
Groomin Dressin Feedin Mobility: 0 Stairs: 0 Toilet Use: 5 Transfer (Bed to Chair and Back): 10 Total: 45 Barthel and G-code impairment scale: 
Percentage of impairment CH 
0% CI 
1-19% CJ 
20-39% CK 
40-59% CL 
60-79% CM 
80-99% CN 
100% Barthel Score 0-100 100 99-80 79-60 59-40 20-39 1-19 
 0 Barthel Score 0-20 20 17-19 13-16 9-12 5-8 1-4 0 The Barthel ADL Index: Guidelines 1. The index should be used as a record of what a patient does, not as a record of what a patient could do. 2. The main aim is to establish degree of independence from any help, physical or verbal, however minor and for whatever reason. 3. The need for supervision renders the patient not independent. 4. A patient's performance should be established using the best available evidence. Asking the patient, friends/relatives and nurses are the usual sources, but direct observation and common sense are also important. However direct testing is not needed. 5. Usually the patient's performance over the preceding 24-48 hours is important, but occasionally longer periods will be relevant. 6. Middle categories imply that the patient supplies over 50 per cent of the effort. 7. Use of aids to be independent is allowed. Dmitry Robin., Barthel, D.W. ([id number]). Functional evaluation: the Barthel Index. [address] [facility] (14)2. [facility] Albino marcello MOE Pires, Katharine Watts, Les Delong., [place], [address] [place] [address] (1999). Measuring the change indisability after inpatient rehabilitation; comparison of the responsiveness of the Barthel Index and Functional [place] Measure. Journal of Neurology, Neurosurgery, and Psychiatry, 66(4), 371-408. Justin Lei, N.J.A, DAREK Pickett, & Ngoc Pierre M.A. (2004.) Assessment of post-stroke quality of life in cost-effectiveness studies: The usefulness of the Barthel Index and the EuroQoL-5D. [facility], 13, 996-84 G codes: In compliance with CMSs Claims Based Outcome Reporting, the following G-code set was chosen for this patient based on their primary functional limitation being treated: The outcome measure chosen to determine the severity of the functional limitation was the BArthel Index with a score of 45/100 which was correlated with the impairment scale. ? Self Care:  
  - CURRENT STATUS: CK - 40%-59% impaired, limited or restricted  - GOAL STATUS: CJ - 20%-39% impaired, limited or restricted  - D/C STATUS:  ---------------To be determined--------------- Occupational Therapy Evaluation Charge Determination History Examination Decision-Making MEDIUM Complexity : Expanded review of history including physical, cognitive and psychosocial  history  MEDIUM Complexity : 3-5 performance deficits relating to physical, cognitive , or psychosocial skils that result in activity limitations and / or participation restrictions MEDIUM Complexity : Patient may present with comorbidities that affect occupational performnce. Miniml to moderate modification of tasks or assistance (eg, physical or verbal ) with assesment(s) is necessary to enable patient to complete evaluation Based on the above components, the patient evaluation is determined to be of the following complexity level: MEDIUM Pain: 
Pain Scale 1: Numeric (0 - 10) Pain Intensity 1: 0 Activity Tolerance: No complaints After treatment:  
[x] Patient left in no apparent distress sitting up in chair 
[] Patient left in no apparent distress in bed 
[x] Call bell left within reach [x] Nursing notified 
[] Caregiver present [x] Bed alarm activated COMMUNICATION/EDUCATION:  
The patients plan of care was discussed with: Physical Therapist and Registered Nurse. 
[] Home safety education was provided and the patient/caregiver indicated understanding. [x] Patient/family have participated as able in goal setting and plan of care. [] Patient/family agree to work toward stated goals and plan of care. [] Patient understands intent and goals of therapy, but is neutral about his/her participation. [] Patient is unable to participate in goal setting and plan of care. This patients plan of care is appropriate for delegation to \[facility] [place] [facility]\"". Thank you for this referral. 
Demetra Esqueda, OTR/L Time Calculation: 39 mins

## 2018-11-02 NOTE — PROGRESS NOTES
Bedside shift change report given to Fortino Tate, [facility] nurse) by uJstin Roberts RN (offgoing nurse). Report included the following information SBAR, Intake/Output, MAR, Accordion and Recent Results. 
  
Zone Phone:    
  
  
Significant changes during shift:  None 
  
  
  
Patient Information 
  
Jamarcus Gibbs 
79 y.o. 
10/30/2018 12:07 PM by Selam Gonzales MD. Star Maguire was admitted from Home 
  
Problem List 
  
       
Patient Active Problem List  
  Diagnosis Date Noted  PAF (paroxysmal atrial fibrillation) ([facility] [address] 10/31/2018  Diabetes mellitus type 2, controlled ([address] 10/31/2018  Stroke (cerebrum) ([address] 10/30/2018  TIA (transient ischemic attack) 10/30/2018  Elevated troponin 10/30/2018  Tobacco abuse 10/30/2018  
  
       
Past Medical History:  
Diagnosis Date  Diabetes mellitus type 2, controlled ([address] 10/31/2018  PAF (paroxysmal atrial fibrillation) ([facility] [address] 10/31/2018  Tobacco abuse 10/30/2018  
  
  
  
Core Measures: 
  
CVA: Yes Yes 
  
Activity Status: 
  
OOB to Chair No 
Ambulated this shift No  
Bed Rest Yes 
  
Supplemental K8: (JM Applicable) 
  
NC Yes  
On 2 Liters/min 
  
DVT prophylaxis: 
  
DVT prophylaxis Med- Yes DVT prophylaxis SCD or AIDAN- No  
  
Patient Safety: 
  
Falls Score Total Score: 4 Safety Level_______ Bed Alarm On? No 
Sitter? No 
  
Plan for upcoming shift: Monitor, awaiting placement 
  
  
  
Discharge Plan: No TBD 
  
Active Consults: 
IP CONSULT TO NEUROLOGY 
IP CONSULT TO NEUROLOGY 
IP CONSULT TO CARDIOLOGY

## 2018-11-03 PROBLEM — I63.9 CVA (CEREBRAL VASCULAR ACCIDENT) (HCC): Status: ACTIVE | Noted: 2018-11-03

## 2018-11-03 LAB
ALBUMIN SERPL-MCNC: 3.6 G/DL (ref 3.5–5)
ALBUMIN/GLOB SERPL: 0.9 {RATIO} (ref 1.1–2.2)
ALP SERPL-CCNC: 97 U/L (ref 45–117)
ALT SERPL-CCNC: 30 U/L (ref 12–78)
ANION GAP SERPL CALC-SCNC: 9 MMOL/L (ref 5–15)
AST SERPL-CCNC: 20 U/L (ref 15–37)
BASOPHILS # BLD: 0.1 K/UL (ref 0–0.1)
BASOPHILS NFR BLD: 1 % (ref 0–1)
BILIRUB SERPL-MCNC: 0.4 MG/DL (ref 0.2–1)
BUN SERPL-MCNC: 16 MG/DL (ref 6–20)
BUN/CREAT SERPL: 17 (ref 12–20)
CALCIUM SERPL-MCNC: 8.6 MG/DL (ref 8.5–10.1)
CHLORIDE SERPL-SCNC: 101 MMOL/L (ref 97–108)
CO2 SERPL-SCNC: 25 MMOL/L (ref 21–32)
CREAT SERPL-MCNC: 0.96 MG/DL (ref 0.7–1.3)
DIFFERENTIAL METHOD BLD: ABNORMAL
EOSINOPHIL # BLD: 0.5 K/UL (ref 0–0.4)
EOSINOPHIL NFR BLD: 3 % (ref 0–7)
ERYTHROCYTE [DISTWIDTH] IN BLOOD BY AUTOMATED COUNT: 13.3 % (ref 11.5–14.5)
GLOBULIN SER CALC-MCNC: 3.8 G/DL (ref 2–4)
GLUCOSE BLD STRIP.AUTO-MCNC: 165 MG/DL (ref 65–100)
GLUCOSE BLD STRIP.AUTO-MCNC: 181 MG/DL (ref 65–100)
GLUCOSE BLD STRIP.AUTO-MCNC: 196 MG/DL (ref 65–100)
GLUCOSE BLD STRIP.AUTO-MCNC: 206 MG/DL (ref 65–100)
GLUCOSE SERPL-MCNC: 169 MG/DL (ref 65–100)
HCT VFR BLD AUTO: 41.4 % (ref 36.6–50.3)
HGB BLD-MCNC: 13.4 G/DL (ref 12.1–17)
IMM GRANULOCYTES # BLD: 0.2 K/UL (ref 0–0.04)
IMM GRANULOCYTES NFR BLD AUTO: 2 % (ref 0–0.5)
LYMPHOCYTES # BLD: 2.1 K/UL (ref 0.8–3.5)
LYMPHOCYTES NFR BLD: 14 % (ref 12–49)
MAGNESIUM SERPL-MCNC: 2.1 MG/DL (ref 1.6–2.4)
MCH RBC QN AUTO: 27.1 PG (ref 26–34)
MCHC RBC AUTO-ENTMCNC: 32.4 G/DL (ref 30–36.5)
MCV RBC AUTO: 83.6 FL (ref 80–99)
MONOCYTES # BLD: 1.1 K/UL (ref 0–1)
MONOCYTES NFR BLD: 7 % (ref 5–13)
NEUTS SEG # BLD: 11.2 K/UL (ref 1.8–8)
NEUTS SEG NFR BLD: 74 % (ref 32–75)
NRBC # BLD: 0 K/UL (ref 0–0.01)
NRBC BLD-RTO: 0 PER 100 WBC
PLATELET # BLD AUTO: 215 K/UL (ref 150–400)
PMV BLD AUTO: 11.4 FL (ref 8.9–12.9)
POTASSIUM SERPL-SCNC: 4.2 MMOL/L (ref 3.5–5.1)
PROT SERPL-MCNC: 7.4 G/DL (ref 6.4–8.2)
RBC # BLD AUTO: 4.95 M/UL (ref 4.1–5.7)
SERVICE CMNT-IMP: ABNORMAL
SODIUM SERPL-SCNC: 135 MMOL/L (ref 136–145)
WBC # BLD AUTO: 15.2 K/UL (ref 4.1–11.1)

## 2018-11-03 PROCEDURE — 83735 ASSAY OF MAGNESIUM: CPT | Performed by: INTERNAL MEDICINE

## 2018-11-03 PROCEDURE — 74011636637 HC RX REV CODE- 636/637: Performed by: INTERNAL MEDICINE

## 2018-11-03 PROCEDURE — 74011250637 HC RX REV CODE- 250/637: Performed by: INTERNAL MEDICINE

## 2018-11-03 PROCEDURE — 74011000250 HC RX REV CODE- 250: Performed by: INTERNAL MEDICINE

## 2018-11-03 PROCEDURE — 80053 COMPREHEN METABOLIC PANEL: CPT | Performed by: INTERNAL MEDICINE

## 2018-11-03 PROCEDURE — 82962 GLUCOSE BLOOD TEST: CPT

## 2018-11-03 PROCEDURE — 85025 COMPLETE CBC W/AUTO DIFF WBC: CPT | Performed by: INTERNAL MEDICINE

## 2018-11-03 PROCEDURE — 65660000000 HC RM CCU STEPDOWN

## 2018-11-03 PROCEDURE — 36415 COLL VENOUS BLD VENIPUNCTURE: CPT | Performed by: INTERNAL MEDICINE

## 2018-11-03 PROCEDURE — 74011250637 HC RX REV CODE- 250/637: Performed by: NURSE PRACTITIONER

## 2018-11-03 RX ORDER — IBUPROFEN 200 MG
1 TABLET ORAL DAILY
Qty: 30 PATCH | Refills: 0 | Status: SHIPPED | OUTPATIENT
Start: 2018-11-04 | End: 2018-12-04

## 2018-11-03 RX ORDER — POLYETHYLENE GLYCOL 3350 17 G/17G
17 POWDER, FOR SOLUTION ORAL DAILY
Qty: 30 EACH | Refills: 1 | Status: SHIPPED | OUTPATIENT
Start: 2018-11-04

## 2018-11-03 RX ORDER — LISINOPRIL 5 MG/1
5 TABLET ORAL DAILY
Status: DISCONTINUED | OUTPATIENT
Start: 2018-11-04 | End: 2018-11-04 | Stop reason: HOSPADM

## 2018-11-03 RX ORDER — METOPROLOL TARTRATE 25 MG/1
12.5 TABLET, FILM COATED ORAL EVERY 12 HOURS
Qty: 60 TAB | Refills: 1 | Status: SHIPPED | OUTPATIENT
Start: 2018-11-03

## 2018-11-03 RX ORDER — METFORMIN HYDROCHLORIDE 500 MG/1
1000 TABLET ORAL 2 TIMES DAILY WITH MEALS
Status: DISCONTINUED | OUTPATIENT
Start: 2018-11-03 | End: 2018-11-04 | Stop reason: HOSPADM

## 2018-11-03 RX ADMIN — Medication 10 ML: at 04:34

## 2018-11-03 RX ADMIN — METOPROLOL TARTRATE 12.5 MG: 25 TABLET ORAL at 21:30

## 2018-11-03 RX ADMIN — Medication 10 ML: at 04:35

## 2018-11-03 RX ADMIN — APIXABAN 5 MG: 5 TABLET, FILM COATED ORAL at 08:35

## 2018-11-03 RX ADMIN — POLYETHYLENE GLYCOL 3350 17 G: 17 POWDER, FOR SOLUTION ORAL at 08:35

## 2018-11-03 RX ADMIN — METFORMIN HYDROCHLORIDE 1000 MG: 500 TABLET ORAL at 17:14

## 2018-11-03 RX ADMIN — PRAVASTATIN SODIUM 80 MG: 40 TABLET ORAL at 21:30

## 2018-11-03 RX ADMIN — Medication 10 ML: at 13:31

## 2018-11-03 RX ADMIN — INSULIN LISPRO 3 UNITS: 100 INJECTION, SOLUTION INTRAVENOUS; SUBCUTANEOUS at 08:35

## 2018-11-03 RX ADMIN — INSULIN LISPRO 3 UNITS: 100 INJECTION, SOLUTION INTRAVENOUS; SUBCUTANEOUS at 17:14

## 2018-11-03 RX ADMIN — APIXABAN 5 MG: 5 TABLET, FILM COATED ORAL at 21:30

## 2018-11-03 RX ADMIN — UMECLIDINIUM BROMIDE AND VILANTEROL TRIFENATATE 1 PUFF: 62.5; 25 POWDER RESPIRATORY (INHALATION) at 08:39

## 2018-11-03 RX ADMIN — INSULIN LISPRO 4 UNITS: 100 INJECTION, SOLUTION INTRAVENOUS; SUBCUTANEOUS at 12:18

## 2018-11-03 RX ADMIN — METOPROLOL TARTRATE 12.5 MG: 25 TABLET ORAL at 08:35

## 2018-11-03 RX ADMIN — Medication 10 ML: at 21:30
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
## 2018-11-03 NOTE — PROGRESS NOTES
Bedside shift change report given to SAMMY Newman (oncoming nurse) by Marguerite Hawkins RN (offgoing nurse). Report included the following information SBAR, Intake/Output, MAR, Accordion and Recent Results. 
  
Zone Phone:   [phone] 
  
  
Significant changes during shift:  None 
  
  
  
Patient Information 
  
Wendy Horvath 
79 y.o. 
10/30/2018 12:07 PM by Sd Campos MD. Star Maguire was admitted from Home 
  
Problem List 
  
       
Patient Active Problem List  
  Diagnosis Date Noted  PAF (paroxysmal atrial fibrillation) ([facility] [address] 10/31/2018  Diabetes mellitus type 2, controlled ([address] 10/31/2018  Stroke (cerebrum) ([facility] 10/30/2018  TIA (transient ischemic attack) 10/30/2018  Elevated troponin 10/30/2018  Tobacco abuse 10/30/2018  
  
       
Past Medical History:  
Diagnosis Date  Diabetes mellitus type 2, controlled ([address] 10/31/2018  PAF (paroxysmal atrial fibrillation) ([facility] 10/31/2018  Tobacco abuse 10/30/2018  
  
  
  
Core Measures: 
  
CVA: Yes Yes 
  
Activity Status: 
  
OOB to Chair No 
Ambulated this shift No  
Bed Rest Yes 
  
Supplemental T2: (SY Applicable) 
  
NC Yes  
On 2 Liters/min 
  
DVT prophylaxis: 
  
DVT prophylaxis Med- Yes DVT prophylaxis SCD or AIDAN- No  
  
Patient Safety: 
  
Falls Score Total Score: 4 Safety Level_______ Bed Alarm On? No 
Sitter? No 
  
Plan for upcoming shift: Monitor, awaiting placement 
  
  
  
Discharge Plan: No TBD 
  
Active Consults: 
IP CONSULT TO NEUROLOGY 
IP CONSULT TO NEUROLOGY 
IP CONSULT TO CARDIOLOGY

## 2018-11-03 NOTE — PROGRESS NOTES
Hospitalist Progress Note NAME: Heath Woods :  1950 MRN:  [id number] Assessment / Plan: CVA:  vision loss, CT shows Right parietal and occipital subacute vs acute CVA,   MRI  Shows multiple embolic CVA, ECHO WNL, c/w Eliquis, Neurology input appreciated HTN: resume ACE, use labetalol prn allowing permissive HTN. Increased Troponin: c/w Eliquis, Cardiology in the case,  by definition NSTEMI. No other plans as per Cardiology DM type 2 with hyperglycemia: resume metformin, c/w SSI, HbA1C 9.3 Leukocytosis ua/xrays ngt: no clear signs of infection, U/A and CXR are negative, may be reactive, no signs of cellulitis, slowly trending down Tobacco: counseled, c/w nicotine patch Morbid  obesity nutrition referral  BMI 44.12 Asthma/COPD - resume meds Surrogate Decision Maker: Emerald Miller [phone] Baseline: ambulatory Code status: Full Prophylaxis: Eliquis Recommended Disposition: LTC Patient lives by himself has no relatives and now has severely impaired vision, may need LTC after the acute period of rehab Consultants to provide final recommendations Patient is stable for D/c to [facility] if bed available. Subjective: Chief Complaint / Reason for Physician Visit \"I feel OK, my vision still impaired\". Discussed with RN events overnight. Review of Systems: 
Symptom Y/N Comments  Symptom Y/N Comments Fever/Chills    Chest Pain Poor Appetite    Edema Cough    Abdominal Pain Sputum    Joint Pain SOB/CONTRERAS    Pruritis/Rash Nausea/vomit    Tolerating PT/OT Diarrhea    Tolerating Diet y Constipation    Other y Samuels vision Could NOT obtain due to:   
 
Objective: VITALS:  
Last 24hrs VS reviewed since prior progress note. Most recent are: 
Patient Vitals for the past 24 hrs: 
 Temp Pulse Resp BP SpO2  
18 0729 97.6 °F (36.4 °C) 65 20 146/63 94 % 18 0431 97 °F (36.1 °C) 66 20 153/55 93 % 11/02/18 2330 97.9 °F (36.6 °C) 68 20 130/63 97 % 11/02/18 1930 97 °F (36.1 °C) 73 22 118/76 94 % 11/02/18 1510 97.6 °F (36.4 °C) 73 18 128/60 92 % 11/02/18 1157 97.4 °F (36.3 °C) 72 18 114/63 94 % Intake/Output Summary (Last 24 hours) at 11/3/2018 1102 Last data filed at 11/3/2018 7393 Gross per 24 hour Intake 980 ml Output 1350 ml Net -370 ml PHYSICAL EXAM: 
General: WD, WN. Alert, cooperative, no acute distress   
EENT:  EOMI. Anicteric sclerae. MMM Resp:  Coarse BS, rhonchi CV:  Regular  rhythm,  No edema GI:  Soft, Non distended, Non tender.  +Bowel sounds Neurologic:  Alert and oriented X 3, normal speech, Psych:   Fair  insight. Not anxious nor agitated Skin:  No rashes. No jaundice Reviewed most current lab test results and cultures  YES Reviewed most current radiology test results   YES Review and summation of old records today    NO Reviewed patient's current orders and MAR    YES 
PMH/SH reviewed - no change compared to H&P 
________________________________________________________________________ Care Plan discussed with: 
  Comments Patient y Family RN y   
Care Manager Consultant Multidiciplinary team rounds were held today with , nursing, pharmacist and clinical coordinator. Patient's plan of care was discussed; medications were reviewed and discharge planning was addressed. ________________________________________________________________________ Total NON critical care TIME:  20   Minutes Total CRITICAL CARE TIME Spent:   Minutes non procedure based Comments >50% of visit spent in counseling and coordination of care y   
________________________________________________________________________ Trudy Mejía MD  
 
Procedures: see electronic medical records for all procedures/Xrays and details which were not copied into this note but were reviewed prior to creation of Plan.    
 
LABS: 
 I reviewed today's most current labs and imaging studies. Pertinent labs include: 
Recent Labs 11/03/18 
4316 11/02/18 
0524 11/01/18 
2418 WBC 15.2* 14.0* 15.2* HGB 13.4 12.8 13.2 HCT 41.4 39.7 41.5  200 210 Recent Labs 11/03/18 
2332 11/02/18 
0524 11/01/18 
8722 * 135* 134* K 4.2 4.3 4.3  102 102 CO2 25 27 25 * 184* 167* BUN 16 12 15 CREA 0.96 0.95 1.14  
CA 8.6 8.5 8.5 MG 2.1 2.1 2.2 ALB 3.6 3.5 3.6 TBILI 0.4 0.4 0.5 SGOT 20 19 20 ALT 30 27 31 Signed: Kt Torrez MD

## 2018-11-03 NOTE — PROGRESS NOTES
CM received update from [facility] admissions liaison who reported they would be able to accept Pt on 11/4/18. Harmony Whiteside LCSW Ext # [phone]

## 2018-11-04 ENCOUNTER — HOSPITAL ENCOUNTER (OUTPATIENT)
Dept: REHABILITATION | Age: 68
End: 2018-11-16
Attending: PHYSICAL MEDICINE & REHABILITATION | Admitting: PHYSICAL MEDICINE & REHABILITATION
Payer: MEDICARE

## 2018-11-04 VITALS
SYSTOLIC BLOOD PRESSURE: 118 MMHG | HEART RATE: 80 BPM | HEIGHT: 75 IN | DIASTOLIC BLOOD PRESSURE: 67 MMHG | TEMPERATURE: 97.2 F | BODY MASS INDEX: 39.17 KG/M2 | OXYGEN SATURATION: 95 % | WEIGHT: 315 LBS | RESPIRATION RATE: 20 BRPM

## 2018-11-04 DIAGNOSIS — M54.50 LOW BACK PAIN: ICD-10-CM

## 2018-11-04 DIAGNOSIS — E11.9 CONTROLLED TYPE 2 DIABETES MELLITUS WITHOUT COMPLICATION, UNSPECIFIED WHETHER LONG TERM INSULIN USE (HCC): ICD-10-CM

## 2018-11-04 DIAGNOSIS — Z72.0 TOBACCO ABUSE: ICD-10-CM

## 2018-11-04 DIAGNOSIS — E78.5 DYSLIPIDEMIA, GOAL LDL BELOW 70: ICD-10-CM

## 2018-11-04 DIAGNOSIS — I63.531 CEREBRAL INFARCTION DUE TO UNSPECIFIED OCCLUSION OR STENOSIS OF RIGHT POSTERIOR CEREBRAL ARTERY (HCC): ICD-10-CM

## 2018-11-04 DIAGNOSIS — N17.9 ACUTE KIDNEY FAILURE (HCC): ICD-10-CM

## 2018-11-04 LAB
APPEARANCE UR: CLEAR
BACTERIA URNS QL MICRO: NEGATIVE /HPF
BILIRUB UR QL CFM: NEGATIVE
COLOR UR: ABNORMAL
EPITH CASTS URNS QL MICRO: ABNORMAL /LPF
GLUCOSE BLD STRIP.AUTO-MCNC: 181 MG/DL (ref 65–100)
GLUCOSE BLD STRIP.AUTO-MCNC: 213 MG/DL (ref 65–100)
GLUCOSE UR STRIP.AUTO-MCNC: NEGATIVE MG/DL
HGB UR QL STRIP: NEGATIVE
HYALINE CASTS URNS QL MICRO: ABNORMAL /LPF (ref 0–5)
KETONES UR QL STRIP.AUTO: NEGATIVE MG/DL
LEUKOCYTE ESTERASE UR QL STRIP.AUTO: NEGATIVE
MUCOUS THREADS URNS QL MICRO: ABNORMAL /LPF
NITRITE UR QL STRIP.AUTO: NEGATIVE
PH UR STRIP: 5.5 [PH] (ref 5–8)
PROT UR STRIP-MCNC: 30 MG/DL
RBC #/AREA URNS HPF: ABNORMAL /HPF (ref 0–5)
SERVICE CMNT-IMP: ABNORMAL
SERVICE CMNT-IMP: ABNORMAL
SP GR UR REFRACTOMETRY: 1.02 (ref 1–1.03)
UA: UC IF INDICATED,UAUC: ABNORMAL
UROBILINOGEN UR QL STRIP.AUTO: 0.2 EU/DL (ref 0.2–1)
WBC URNS QL MICRO: ABNORMAL /HPF (ref 0–4)

## 2018-11-04 PROCEDURE — 74011250637 HC RX REV CODE- 250/637: Performed by: INTERNAL MEDICINE

## 2018-11-04 PROCEDURE — 74011000250 HC RX REV CODE- 250: Performed by: INTERNAL MEDICINE

## 2018-11-04 PROCEDURE — 74011636637 HC RX REV CODE- 636/637: Performed by: INTERNAL MEDICINE

## 2018-11-04 PROCEDURE — 74011250637 HC RX REV CODE- 250/637: Performed by: NURSE PRACTITIONER

## 2018-11-04 PROCEDURE — 82962 GLUCOSE BLOOD TEST: CPT

## 2018-11-04 PROCEDURE — 81001 URINALYSIS AUTO W/SCOPE: CPT | Performed by: PHYSICAL MEDICINE & REHABILITATION

## 2018-11-04 RX ADMIN — INSULIN LISPRO 4 UNITS: 100 INJECTION, SOLUTION INTRAVENOUS; SUBCUTANEOUS at 11:48

## 2018-11-04 RX ADMIN — METFORMIN HYDROCHLORIDE 1000 MG: 500 TABLET ORAL at 09:58

## 2018-11-04 RX ADMIN — UMECLIDINIUM BROMIDE AND VILANTEROL TRIFENATATE 1 PUFF: 62.5; 25 POWDER RESPIRATORY (INHALATION) at 10:01

## 2018-11-04 RX ADMIN — APIXABAN 5 MG: 5 TABLET, FILM COATED ORAL at 09:58

## 2018-11-04 RX ADMIN — INSULIN LISPRO 3 UNITS: 100 INJECTION, SOLUTION INTRAVENOUS; SUBCUTANEOUS at 08:12

## 2018-11-04 RX ADMIN — LISINOPRIL 5 MG: 5 TABLET ORAL at 09:58

## 2018-11-04 RX ADMIN — POLYETHYLENE GLYCOL 3350 17 G: 17 POWDER, FOR SOLUTION ORAL at 09:58

## 2018-11-04 RX ADMIN — METOPROLOL TARTRATE 12.5 MG: 25 TABLET ORAL at 09:58

## 2018-11-04 RX ADMIN — Medication 10 ML: at 03:10

## 2018-11-04 NOTE — PROGRESS NOTES
Bedside shift change report given to SAMMY Mckinnon (oncoming nurse) by SAMMY Newman (offgoing nurse). Report included the following information SBAR, Intake/Output, MAR, Accordion and Recent Results. 
  
Zone Phone:   [phone] 
  
  
Significant changes during shift:  None 
  
  
  
Patient Information 
  
Coral Laser 
79 y.o. 
10/30/2018 12:07 PM by Elle August MD. Star Maguire was admitted from Home 
  
Problem List 
  
       
Patient Active Problem List  
  Diagnosis Date Noted  PAF (paroxysmal atrial fibrillation) ([facility] 10/31/2018  Diabetes mellitus type 2, controlled ([facility] 10/31/2018  Stroke (cerebrum) ([facility] 10/30/2018  TIA (transient ischemic attack) 10/30/2018  Elevated troponin 10/30/2018  Tobacco abuse 10/30/2018  
  
       
Past Medical History:  
Diagnosis Date  Diabetes mellitus type 2, controlled ([facility] 10/31/2018  PAF (paroxysmal atrial fibrillation) ([facility] 10/31/2018  Tobacco abuse 10/30/2018  
  
  
  
Core Measures: 
  
CVA: Yes Yes 
  
Activity Status: 
  
OOB to Chair No 
Ambulated this shift No  
Bed Rest Yes 
  
Supplemental T2: (VZ Applicable) 
  
NC Yes  
On 2 Liters/min 
  
DVT prophylaxis: 
  
DVT prophylaxis Med- Yes DVT prophylaxis SCD or AIDAN- No  
  
Patient Safety: 
  
Falls Score Total Score: 4 Safety Level_______ Bed Alarm On? No 
Sitter? No 
  
Plan for upcoming shift: neuro checks, safety  
  
  
Discharge Plan: [facility] when stable 
  
Active Consults: 
IP CONSULT TO NEUROLOGY 
IP CONSULT TO NEUROLOGY 
IP CONSULT TO CARDIOLOGY

## 2018-11-04 NOTE — ROUTINE PROCESS
Transport picked up [facility] to [facility]. Discharge paperwork and prescriptions with transport.

## 2018-11-04 NOTE — DISCHARGE INSTRUCTIONS
HOSPITALIST DISCHARGE INSTRUCTIONS    NAME: Jody Cancer   :  1950   MRN:  [id number]     Date/Time:  2018 11:11 AM    ADMIT DATE: 10/30/2018   DISCHARGE DATE: 2018     Attending Physician: Diogenes Salcedo MD    DISCHARGE DIAGNOSIS:  CVA, HTN, Increased Troponin, DM, Leukocytosis, Smoker, Morbid Obesity, Asthma/COPD      Medications: Per above medication reconciliation. Pain Management: per above medications    Recommended diet: Cardiac Diet and Diabetic Diet    Recommended activity: Activity as tolerated    Wound care: None    Indwelling devices:  None    Supplemental Oxygen: Chronic Oxygen,  2-3  LNC at baseline    Required Lab work: Per SNF routine    Glucose management:  Accucheck ACHS with sliding scale per SNF protocol    Code status: Full        Outside physician follow up: Follow-up Information     Follow up With Specialties Details Why Contact Info    Fareed Mtz MD Cardiology, Cardio Vascular Surgery, Internal Medicine In 2 weeks  [address] [phone]      Elvis Party, NP Nurse Practitioner   [address] [place] [address]  [phone]        F/U PCP  F/U Neurology  F/U Cardiology         Skilled nursing facility/ SNF MD responsible for above on discharge. Information obtained by :  I understand that if any problems occur once I am at home I am to contact my physician. I understand and acknowledge receipt of the instructions indicated above.                                                                                                                                            Physician's or R.N.'s Signature                                                                  Date/Time                                                                                                                                              Patient or Repres
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
## 2018-11-04 NOTE — ROUTINE PROCESS
Pt being discharged to [address] [place] [address]. Report called to Siobhan Carter. All questions answered.

## 2018-11-04 NOTE — PROGRESS NOTES
[facility] advised a bed will be available to patient this afternoon. Nursing to call report to [phone]. Care Management Interventions PCP Verified by CM: Yes Last Visit to PCP: 11/02/18 Mode of Transport at Discharge: Other (see comment)(Pt has a friend to transport at discharge) Transition of Care Consult (CM Consult): Other([facility]) Discharge Durable Medical Equipment: No(Pt does not have DME in the home) Physical Therapy Consult: Yes Occupational Therapy Consult: Yes Speech Therapy Consult: Yes Current Support Network: Own Home, Lives Alone(Pt lives in a motel on the first floor alone) Confirm Follow Up Transport: Self(Pt does drive, but has supportive friends to assist with transportation as needed) Plan discussed with Pt/Family/Caregiver: Yes Discharge Location Discharge Placement: Rehab hospital/unit acute

## 2018-11-05 ENCOUNTER — TELEPHONE (OUTPATIENT)
Dept: CASE MANAGEMENT | Age: 68
End: 2018-11-05

## 2018-11-05 LAB
ALBUMIN SERPL-MCNC: 3.5 G/DL (ref 3.5–5)
ALBUMIN/GLOB SERPL: 0.9 {RATIO} (ref 1.1–2.2)
ALP SERPL-CCNC: 99 U/L (ref 45–117)
ALT SERPL-CCNC: 31 U/L (ref 12–78)
ANION GAP SERPL CALC-SCNC: 5 MMOL/L (ref 5–15)
AST SERPL-CCNC: 18 U/L (ref 15–37)
BASOPHILS # BLD: 0.1 K/UL (ref 0–0.1)
BASOPHILS NFR BLD: 1 % (ref 0–1)
BILIRUB SERPL-MCNC: 0.5 MG/DL (ref 0.2–1)
BUN SERPL-MCNC: 26 MG/DL (ref 6–20)
BUN/CREAT SERPL: 18 (ref 12–20)
CALCIUM SERPL-MCNC: 8.7 MG/DL (ref 8.5–10.1)
CHLORIDE SERPL-SCNC: 100 MMOL/L (ref 97–108)
CO2 SERPL-SCNC: 28 MMOL/L (ref 21–32)
CREAT SERPL-MCNC: 1.46 MG/DL (ref 0.7–1.3)
DIFFERENTIAL METHOD BLD: ABNORMAL
EOSINOPHIL # BLD: 0.5 K/UL (ref 0–0.4)
EOSINOPHIL NFR BLD: 3 % (ref 0–7)
ERYTHROCYTE [DISTWIDTH] IN BLOOD BY AUTOMATED COUNT: 13.3 % (ref 11.5–14.5)
GLOBULIN SER CALC-MCNC: 3.9 G/DL (ref 2–4)
GLUCOSE SERPL-MCNC: 168 MG/DL (ref 65–100)
HCT VFR BLD AUTO: 41.2 % (ref 36.6–50.3)
HGB BLD-MCNC: 13.3 G/DL (ref 12.1–17)
IMM GRANULOCYTES # BLD: 0.3 K/UL (ref 0–0.04)
IMM GRANULOCYTES NFR BLD AUTO: 2 % (ref 0–0.5)
LYMPHOCYTES # BLD: 2.4 K/UL (ref 0.8–3.5)
LYMPHOCYTES NFR BLD: 16 % (ref 12–49)
MAGNESIUM SERPL-MCNC: 2.5 MG/DL (ref 1.6–2.4)
MCH RBC QN AUTO: 27.1 PG (ref 26–34)
MCHC RBC AUTO-ENTMCNC: 32.3 G/DL (ref 30–36.5)
MCV RBC AUTO: 83.9 FL (ref 80–99)
MONOCYTES # BLD: 1 K/UL (ref 0–1)
MONOCYTES NFR BLD: 7 % (ref 5–13)
NEUTS SEG # BLD: 10.1 K/UL (ref 1.8–8)
NEUTS SEG NFR BLD: 71 % (ref 32–75)
NRBC # BLD: 0 K/UL (ref 0–0.01)
NRBC BLD-RTO: 0 PER 100 WBC
PLATELET # BLD AUTO: 208 K/UL (ref 150–400)
PMV BLD AUTO: 11 FL (ref 8.9–12.9)
POTASSIUM SERPL-SCNC: 4.4 MMOL/L (ref 3.5–5.1)
PROT SERPL-MCNC: 7.4 G/DL (ref 6.4–8.2)
RBC # BLD AUTO: 4.91 M/UL (ref 4.1–5.7)
SODIUM SERPL-SCNC: 133 MMOL/L (ref 136–145)
WBC # BLD AUTO: 14.4 K/UL (ref 4.1–11.1)

## 2018-11-05 PROCEDURE — 83735 ASSAY OF MAGNESIUM: CPT | Performed by: PHYSICAL MEDICINE & REHABILITATION

## 2018-11-05 PROCEDURE — 36415 COLL VENOUS BLD VENIPUNCTURE: CPT | Performed by: PHYSICAL MEDICINE & REHABILITATION

## 2018-11-05 PROCEDURE — 85025 COMPLETE CBC W/AUTO DIFF WBC: CPT | Performed by: PHYSICAL MEDICINE & REHABILITATION

## 2018-11-05 PROCEDURE — 80053 COMPREHEN METABOLIC PANEL: CPT | Performed by: PHYSICAL MEDICINE & REHABILITATION

## 2018-11-08 LAB
ANION GAP SERPL CALC-SCNC: 10 MMOL/L (ref 5–15)
BUN SERPL-MCNC: 33 MG/DL (ref 6–20)
BUN/CREAT SERPL: 27 (ref 12–20)
CALCIUM SERPL-MCNC: 8.7 MG/DL (ref 8.5–10.1)
CHLORIDE SERPL-SCNC: 101 MMOL/L (ref 97–108)
CO2 SERPL-SCNC: 24 MMOL/L (ref 21–32)
CREAT SERPL-MCNC: 1.21 MG/DL (ref 0.7–1.3)
ERYTHROCYTE [DISTWIDTH] IN BLOOD BY AUTOMATED COUNT: 13.1 % (ref 11.5–14.5)
GLUCOSE SERPL-MCNC: 197 MG/DL (ref 65–100)
HCT VFR BLD AUTO: 40.1 % (ref 36.6–50.3)
HGB BLD-MCNC: 13 G/DL (ref 12.1–17)
MCH RBC QN AUTO: 26.7 PG (ref 26–34)
MCHC RBC AUTO-ENTMCNC: 32.4 G/DL (ref 30–36.5)
MCV RBC AUTO: 82.3 FL (ref 80–99)
NRBC # BLD: 0 K/UL (ref 0–0.01)
NRBC BLD-RTO: 0 PER 100 WBC
PLATELET # BLD AUTO: 172 K/UL (ref 150–400)
PMV BLD AUTO: 11.5 FL (ref 8.9–12.9)
POTASSIUM SERPL-SCNC: 4.4 MMOL/L (ref 3.5–5.1)
RBC # BLD AUTO: 4.87 M/UL (ref 4.1–5.7)
SODIUM SERPL-SCNC: 135 MMOL/L (ref 136–145)
WBC # BLD AUTO: 15.7 K/UL (ref 4.1–11.1)

## 2018-11-08 PROCEDURE — 85027 COMPLETE CBC AUTOMATED: CPT

## 2018-11-08 PROCEDURE — 80048 BASIC METABOLIC PNL TOTAL CA: CPT

## 2018-11-08 PROCEDURE — 36415 COLL VENOUS BLD VENIPUNCTURE: CPT

## 2018-11-12 ENCOUNTER — APPOINTMENT (OUTPATIENT)
Dept: GENERAL RADIOLOGY | Age: 68
End: 2018-11-12
Attending: PHYSICAL MEDICINE & REHABILITATION
Payer: MEDICARE

## 2018-11-12 ENCOUNTER — APPOINTMENT (OUTPATIENT)
Dept: CT IMAGING | Age: 68
End: 2018-11-12
Attending: PHYSICAL MEDICINE & REHABILITATION
Payer: MEDICARE

## 2018-11-12 LAB
ANION GAP SERPL CALC-SCNC: 6 MMOL/L (ref 5–15)
BUN SERPL-MCNC: 30 MG/DL (ref 6–20)
BUN/CREAT SERPL: 17 (ref 12–20)
CALCIUM SERPL-MCNC: 9.3 MG/DL (ref 8.5–10.1)
CHLORIDE SERPL-SCNC: 100 MMOL/L (ref 97–108)
CO2 SERPL-SCNC: 26 MMOL/L (ref 21–32)
CREAT SERPL-MCNC: 1.77 MG/DL (ref 0.7–1.3)
ERYTHROCYTE [DISTWIDTH] IN BLOOD BY AUTOMATED COUNT: 13 % (ref 11.5–14.5)
GLUCOSE SERPL-MCNC: 224 MG/DL (ref 65–100)
HCT VFR BLD AUTO: 39.8 % (ref 36.6–50.3)
HGB BLD-MCNC: 13.2 G/DL (ref 12.1–17)
MCH RBC QN AUTO: 27.3 PG (ref 26–34)
MCHC RBC AUTO-ENTMCNC: 33.2 G/DL (ref 30–36.5)
MCV RBC AUTO: 82.2 FL (ref 80–99)
NRBC # BLD: 0 K/UL (ref 0–0.01)
NRBC BLD-RTO: 0 PER 100 WBC
PLATELET # BLD AUTO: 146 K/UL (ref 150–400)
PMV BLD AUTO: 11.7 FL (ref 8.9–12.9)
POTASSIUM SERPL-SCNC: 4.6 MMOL/L (ref 3.5–5.1)
RBC # BLD AUTO: 4.84 M/UL (ref 4.1–5.7)
SODIUM SERPL-SCNC: 132 MMOL/L (ref 136–145)
WBC # BLD AUTO: 21.9 K/UL (ref 4.1–11.1)

## 2018-11-12 PROCEDURE — 36415 COLL VENOUS BLD VENIPUNCTURE: CPT

## 2018-11-12 PROCEDURE — 74018 RADEX ABDOMEN 1 VIEW: CPT

## 2018-11-12 PROCEDURE — 85027 COMPLETE CBC AUTOMATED: CPT

## 2018-11-12 PROCEDURE — 80048 BASIC METABOLIC PNL TOTAL CA: CPT

## 2018-11-12 PROCEDURE — 70450 CT HEAD/BRAIN W/O DYE: CPT

## 2018-11-13 NOTE — CONSULTS
NEUROLOGY NOTE     Chief complain: right sided weakness    Reason for Consult  I have been asked to see the patient in neurological consultation by Candy Scott MD to render advice and opinion regarding possible stroke    HPI  Jessenia Carolina is a 79 y.o. male initially presented to the hospital because of acute vision loss and was found to have multiple embolic infarcts. No focal weakness at that time. His w/u was completed and he was started on eliquis and tx to rehab. Yesterday he did have new onset right sided weakness and CT of the head shows a new lacunar infarct in the left parietal area which is new from the last imaging. Neurology consulted for further management. ROS  A ten system review of constitutional, cardiovascular, respiratory, musculoskeletal, endocrine, skin, SHEENT, genitourinary, psychiatric and neurologic systems was obtained and is unremarkable except as stated in HPI     PMH  Past Medical History:   Diagnosis Date    Diabetes mellitus type 2, controlled ([facility] 10/31/2018    PAF (paroxysmal atrial fibrillation) ([facility] 10/31/2018    Tobacco abuse 10/30/2018       FH  No family history on file.       Social History     Socioeconomic History    Marital status: SINGLE     Spouse name: Not on file    Number of children: Not on file    Years of education: Not on file    Highest education level: Not on file   Social Needs    Financial resource strain: Not on file    Food insecurity - worry: Not on file    Food insecurity - inability: Not on file    Transportation needs - medical: Not on file   [employer] needs - non-medical: Not on file   Occupational History    Not on file   Tobacco Use    Smoking status: Heavy Tobacco Smoker     Packs/day: 2.00    Smokeless tobacco: Never Used   Substance and Sexual Activity    Alcohol use: No     Frequency: Never    Drug use: No    Sexual activity: Not on file   Other Topics Concern    Not on file   Social History Narrative    Not on file       ALLERGIES  No Known Allergies    PHYSICAL EXAMINATION:   No data found. General:   General appearance: Pt is in no acute distress   Distal pulses are preserved  Fundoscopic exam: attempted    Neurological Examination:   Mental Status:  AAO x3. Speech is fluent. Follows commands, has normal fund of knowledge, attention, short term recall, comprehension and insight. Cranial Nerves: Bilateral vision loss. PERRL, Extraocular movements are full. Facial sensation intact. Facial movement intact. Hearing intact to conversation. Palate elevates symmetrically. Shoulder shrug symmetric. Tongue midline. Motor: Strength is 5/5 in all 4 ext. Normal tone. No atrophy. Sensation: Normal to light touch    Coordination/Cerebellar: Intact to finger-nose-finger     Gait: deferred    Skin: No significant bruising or lacerations. LAB DATA REVIEWED:    No results found for this or any previous visit (from the past 24 hour(s)). HOME MEDS  Prior to Admission Medications   Prescriptions Last Dose Informant Patient Reported? Taking? albuterol (VENTOLIN HFA) 90 mcg/actuation inhaler  Self Yes No   Sig: Take 2 Puffs by inhalation every four (4) hours as needed for Wheezing. apixaban (ELIQUIS) 5 mg tablet   No No   Sig: Take 1 Tab by mouth every twelve (12) hours. lisinopril (PRINIVIL, ZESTRIL) 5 mg tablet  Self Yes No   Sig: Take 5 mg by mouth daily. metFORMIN (GLUCOPHAGE) 1,000 mg tablet  Self Yes No   Sig: Take 1,000 mg by mouth two (2) times daily (with meals). metoprolol tartrate (LOPRESSOR) 25 mg tablet   No No   Sig: Take 0.5 Tabs by mouth every twelve (12) hours. nicotine (NICODERM CQ) 21 mg/24 hr   No No   Si Patch by TransDERmal route daily for 30 days. polyethylene glycol (MIRALAX) 17 gram packet   No No   Sig: Take 1 Packet by mouth daily. rosuvastatin (CRESTOR) 10 mg tablet  Self Yes No   Sig: Take 10 mg by mouth nightly.    umeclidinium-vilanterol (ANORO ELLIPTA) 62.5-25 mcg/actuation inhaler  Self Yes No   Sig: Take 1 Puff by inhalation daily. Facility-Administered Medications: None       CURRENT MEDS  No current facility-administered medications for this encounter. Stroke workup  11/12/18  CT Head  1. Suspect relatively acute small left posterior parietal infarct which was not  present on 11/1/2018.  2. Otherwise unchanged with subacute right parietal and bilateral occipital  infarcts noted. 11/1/18  MRI Brain  1. Diffuse distribution of multifocal cortical infarction involving all lobes of  both cerebral hemispheres and cerebellum and pattern most suggestive of emboli. Vasculitis less likely. 2. Area of decreased attenuation right posterior cerebral hemisphere corresponds  to confluence of acute infarction involving right parietal, posterior temporal  and occipital lobes. CTA Head and neck  Moderate acute right parieto-occipital infarction with thromboembolic occlusion  of a tiny distal right posterior cerebral artery branch. No acute large vessel  arterial occlusion. Questionable small focal cortical hypoattenuation in the  left occipital lobe. 3 mm right posterior communicating artery probable infundibulum, less likely  aneurysm    TTE:   Systolic function was normal. Ejection fraction was estimated in the range of 55 % to 60 %. No obvious wall  motion abnormalities identified in the views obtained. Wall thickness was normal.    Stroke labs:  HgBA1c    Lab Results   Component Value Date/Time    Hemoglobin A1c 9.3 (H) 10/31/2018 01:59 AM     LDL   Lab Results   Component Value Date/Time    LDL, calculated 78.4 10/31/2018 01:59 AM       IMPRESSION:  1. New left parietal infarct  2. Recent hx of embolic infarct  3. HTN  4. DM  5. HLD    RECOMMENDATIONS:  - Pt already on eliquis  - Add aspirin 81 mg a day  - Goal LDL is less than 70 and goal HbA1c is less than 7.  Treatment as per primary team.  - BP less than 140/90  - Counseled regarding smoking cessation  - Continue pt/ot/st    Call with questions.        Fany Patel MD  Neurologist

## 2018-11-14 ENCOUNTER — APPOINTMENT (OUTPATIENT)
Dept: ULTRASOUND IMAGING | Age: 68
End: 2018-11-14
Attending: INTERNAL MEDICINE
Payer: MEDICARE

## 2018-11-14 LAB
ANION GAP SERPL CALC-SCNC: 8 MMOL/L (ref 5–15)
BUN SERPL-MCNC: 63 MG/DL (ref 6–20)
BUN/CREAT SERPL: 22 (ref 12–20)
CALCIUM SERPL-MCNC: 9.3 MG/DL (ref 8.5–10.1)
CHLORIDE SERPL-SCNC: 101 MMOL/L (ref 97–108)
CO2 SERPL-SCNC: 24 MMOL/L (ref 21–32)
COMMENT, HOLDF: NORMAL
CREAT SERPL-MCNC: 2.83 MG/DL (ref 0.7–1.3)
CREAT UR-MCNC: 237 MG/DL
ERYTHROCYTE [DISTWIDTH] IN BLOOD BY AUTOMATED COUNT: 13.1 % (ref 11.5–14.5)
GLUCOSE SERPL-MCNC: 222 MG/DL (ref 65–100)
HCT VFR BLD AUTO: 37.5 % (ref 36.6–50.3)
HCYS SERPL-SCNC: 23.2 UMOL/L (ref 3.7–13.9)
HGB BLD-MCNC: 12.2 G/DL (ref 12.1–17)
MCH RBC QN AUTO: 26.6 PG (ref 26–34)
MCHC RBC AUTO-ENTMCNC: 32.5 G/DL (ref 30–36.5)
MCV RBC AUTO: 81.9 FL (ref 80–99)
NRBC # BLD: 0 K/UL (ref 0–0.01)
NRBC BLD-RTO: 0 PER 100 WBC
PLATELET # BLD AUTO: 148 K/UL (ref 150–400)
PMV BLD AUTO: 11.9 FL (ref 8.9–12.9)
POTASSIUM SERPL-SCNC: 4.6 MMOL/L (ref 3.5–5.1)
RBC # BLD AUTO: 4.58 M/UL (ref 4.1–5.7)
SAMPLES BEING HELD,HOLD: NORMAL
SODIUM SERPL-SCNC: 133 MMOL/L (ref 136–145)
SODIUM UR-SCNC: 33 MMOL/L
WBC # BLD AUTO: 21.9 K/UL (ref 4.1–11.1)

## 2018-11-14 PROCEDURE — 83090 ASSAY OF HOMOCYSTEINE: CPT

## 2018-11-14 PROCEDURE — 82570 ASSAY OF URINE CREATININE: CPT

## 2018-11-14 PROCEDURE — 76770 US EXAM ABDO BACK WALL COMP: CPT

## 2018-11-14 PROCEDURE — 36415 COLL VENOUS BLD VENIPUNCTURE: CPT

## 2018-11-14 PROCEDURE — 84300 ASSAY OF URINE SODIUM: CPT

## 2018-11-14 PROCEDURE — 80048 BASIC METABOLIC PNL TOTAL CA: CPT

## 2018-11-14 PROCEDURE — 85027 COMPLETE CBC AUTOMATED: CPT

## 2018-11-15 LAB
ALBUMIN SERPL-MCNC: 3 G/DL (ref 3.5–5)
ANION GAP SERPL CALC-SCNC: 8 MMOL/L (ref 5–15)
BUN SERPL-MCNC: 55 MG/DL (ref 6–20)
BUN/CREAT SERPL: 25 (ref 12–20)
CALCIUM SERPL-MCNC: 9 MG/DL (ref 8.5–10.1)
CHLORIDE SERPL-SCNC: 103 MMOL/L (ref 97–108)
CK SERPL-CCNC: 100 U/L (ref 39–308)
CO2 SERPL-SCNC: 23 MMOL/L (ref 21–32)
COMMENT, HOLDF: NORMAL
CORTIS SERPL-MCNC: 30.4 UG/DL
CREAT SERPL-MCNC: 2.2 MG/DL (ref 0.7–1.3)
GLUCOSE SERPL-MCNC: 209 MG/DL (ref 65–100)
PHOSPHATE SERPL-MCNC: 3.5 MG/DL (ref 2.6–4.7)
POTASSIUM SERPL-SCNC: 4.7 MMOL/L (ref 3.5–5.1)
SAMPLES BEING HELD,HOLD: NORMAL
SODIUM SERPL-SCNC: 134 MMOL/L (ref 136–145)
TSH SERPL DL<=0.05 MIU/L-ACNC: 0.35 UIU/ML (ref 0.36–3.74)

## 2018-11-15 PROCEDURE — 36415 COLL VENOUS BLD VENIPUNCTURE: CPT

## 2018-11-15 PROCEDURE — 82550 ASSAY OF CK (CPK): CPT

## 2018-11-15 PROCEDURE — 82533 TOTAL CORTISOL: CPT

## 2018-11-15 PROCEDURE — 80069 RENAL FUNCTION PANEL: CPT

## 2018-11-15 PROCEDURE — 84443 ASSAY THYROID STIM HORMONE: CPT

## 2018-11-16 LAB
ANION GAP SERPL CALC-SCNC: 8 MMOL/L (ref 5–15)
BUN SERPL-MCNC: 47 MG/DL (ref 6–20)
BUN/CREAT SERPL: 25 (ref 12–20)
CALCIUM SERPL-MCNC: 9.4 MG/DL (ref 8.5–10.1)
CHLORIDE SERPL-SCNC: 102 MMOL/L (ref 97–108)
CO2 SERPL-SCNC: 23 MMOL/L (ref 21–32)
CREAT SERPL-MCNC: 1.85 MG/DL (ref 0.7–1.3)
GLUCOSE SERPL-MCNC: 269 MG/DL (ref 65–100)
POTASSIUM SERPL-SCNC: 4.7 MMOL/L (ref 3.5–5.1)
SODIUM SERPL-SCNC: 133 MMOL/L (ref 136–145)

## 2018-11-16 PROCEDURE — 36415 COLL VENOUS BLD VENIPUNCTURE: CPT

## 2018-11-16 PROCEDURE — 80048 BASIC METABOLIC PNL TOTAL CA: CPT

## 2018-11-28 ENCOUNTER — HOSPITAL ENCOUNTER (OUTPATIENT)
Dept: GENERAL RADIOLOGY | Age: 68
Discharge: HOME | End: 2018-11-28
Payer: OTHER MISCELLANEOUS

## 2018-11-28 DIAGNOSIS — R05.9 COUGH: ICD-10-CM

## 2018-11-28 DIAGNOSIS — R09.89 CHEST CONGESTION: ICD-10-CM

## 2018-11-28 DIAGNOSIS — D72.829 LEUKOCYTOSIS: ICD-10-CM

## 2018-11-28 PROCEDURE — 71046 X-RAY EXAM CHEST 2 VIEWS: CPT

## 2018-12-02 PROBLEM — Z72.0 TOBACCO ABUSE: Status: RESOLVED | Noted: 2018-10-30 | Resolved: 2018-12-02

## 2018-12-02 PROBLEM — G45.9 TIA (TRANSIENT ISCHEMIC ATTACK): Status: RESOLVED | Noted: 2018-10-30 | Resolved: 2018-12-02

## 2018-12-02 PROBLEM — J44.9 COPD (CHRONIC OBSTRUCTIVE PULMONARY DISEASE) (HCC): Status: ACTIVE | Noted: 2018-12-02

## 2018-12-02 PROBLEM — Z86.73 CEREBROVASCULAR DISEASE, ARTERIOSCLEROTIC, POST-STROKE: Status: ACTIVE | Noted: 2018-12-02

## 2018-12-02 PROBLEM — I25.10 CORONARY ARTERY DISEASE INVOLVING NATIVE CORONARY ARTERY OF NATIVE HEART WITHOUT ANGINA PECTORIS: Status: ACTIVE | Noted: 2018-12-02

## 2018-12-02 PROBLEM — E53.8 B12 DEFICIENCY: Status: ACTIVE | Noted: 2018-12-02

## 2018-12-02 PROBLEM — I48.0 PAF (PAROXYSMAL ATRIAL FIBRILLATION) (HCC): Status: RESOLVED | Noted: 2018-10-31 | Resolved: 2018-12-02

## 2018-12-02 PROBLEM — Z86.73 HISTORY OF TIA (TRANSIENT ISCHEMIC ATTACK): Status: ACTIVE | Noted: 2018-12-02

## 2018-12-02 PROBLEM — I63.9 CVA (CEREBRAL VASCULAR ACCIDENT) (HCC): Status: RESOLVED | Noted: 2018-11-03 | Resolved: 2018-12-02

## 2018-12-02 PROBLEM — I63.9 STROKE (CEREBRUM) (HCC): Status: RESOLVED | Noted: 2018-10-30 | Resolved: 2018-12-02

## 2018-12-02 PROBLEM — E78.2 HYPERLIPIDEMIA, MIXED: Status: ACTIVE | Noted: 2018-12-02

## 2018-12-02 PROBLEM — R77.8 ELEVATED TROPONIN: Status: RESOLVED | Noted: 2018-10-30 | Resolved: 2018-12-02

## 2018-12-02 PROBLEM — I67.2 CEREBROVASCULAR DISEASE, ARTERIOSCLEROTIC, POST-STROKE: Status: ACTIVE | Noted: 2018-12-02

## 2020-07-23 NOTE — PROGRESS NOTES
Bedside shift change report given to SAMMY Victor (oncoming nurse) by Radha Medina RN (offgoing nurse). Report included the following information SBAR, Intake/Output, MAR, Accordion and Recent Results. 
  
Zone Phone:    
  
  
Significant changes during shift:  None 
  
  
  
Patient Information 
  
Ector Cooper 
79 y.o. 
10/30/2018 12:07 PM by Melvin Sunshine MD. Star Maguire was admitted from Home 
  
Problem List 
  
       
Patient Active Problem List  
  Diagnosis Date Noted  PAF (paroxysmal atrial fibrillation) ([facility] 10/31/2018  Diabetes mellitus type 2, controlled ([address] 10/31/2018  Stroke (cerebrum) ([facility] [address] 10/30/2018  TIA (transient ischemic attack) 10/30/2018  Elevated troponin 10/30/2018  Tobacco abuse 10/30/2018  
  
       
Past Medical History:  
Diagnosis Date  Diabetes mellitus type 2, controlled ([facility] [address] [facility] 10/31/2018  PAF (paroxysmal atrial fibrillation) ([facility] [address] [facility] 10/31/2018  Tobacco abuse 10/30/2018  
  
  
  
Core Measures: 
  
CVA: Yes Yes 
  
Activity Status: 
  
OOB to Chair No 
Ambulated this shift No  
Bed Rest Yes 
  
Supplemental U7: (RQ Applicable) 
  
NC Yes  
On 2 Liters/min 
  
DVT prophylaxis: 
  
DVT prophylaxis Med- Yes DVT prophylaxis SCD or AIDAN- No  
  
Patient Safety: 
  
Falls Score Total Score: 4 Safety Level_______ Bed Alarm On? No 
Sitter? No 
  
Plan for upcoming shift: Monitor, awaiting placement 
  
  
  
Discharge Plan: No TBD 
  
Active Consults: 
IP CONSULT TO NEUROLOGY 
IP CONSULT TO NEUROLOGY 
IP CONSULT TO CARDIOLOGY Home

## 2022-01-09 NOTE — PROGRESS NOTES
Bedside shift change report given to Sherif Lares, [facility] nurse) by Jesus Perez RN (offgoing nurse). Report included the following information SBAR, Intake/Output, MAR, Accordion and Recent Results. 
  
Zone Phone:   [phone] 
  
  
Significant changes during shift:  MRI at [facility] 
  
  
  
Patient Information 
  
Ernst Sanchez 
79 y.o. 
10/30/2018 12:07 PM by George Cortes MD. Star Maguire was admitted from Home 
  
Problem List 
  
       
Patient Active Problem List  
  Diagnosis Date Noted  PAF (paroxysmal atrial fibrillation) ([facility] 10/31/2018  Diabetes mellitus type 2, controlled ([address] 10/31/2018  Stroke (cerebrum) ([address] 10/30/2018  TIA (transient ischemic attack) 10/30/2018  Elevated troponin 10/30/2018  Tobacco abuse 10/30/2018  
  
       
Past Medical History:  
Diagnosis Date  Diabetes mellitus type 2, controlled ([address] 10/31/2018  PAF (paroxysmal atrial fibrillation) ([facility] 10/31/2018  Tobacco abuse 10/30/2018  
  
  
  
Core Measures: 
  
CVA: Yes Yes 
  
Activity Status: 
  
OOB to Chair No 
Ambulated this shift No  
Bed Rest Yes 
  
Supplemental O8: (RN Applicable) 
  
NC Yes  
On 2 Liters/min 
  
DVT prophylaxis: 
  
DVT prophylaxis Med- Yes DVT prophylaxis SCD or AIDAN- No  
  
Patient Safety: 
  
Falls Score Total Score: 4 Safety Level_______ Bed Alarm On? No 
Sitter? No 
  
Plan for upcoming shift: Monitor 
  
  
  
Discharge Plan: No TBD 
  
Active Consults: 
IP CONSULT TO NEUROLOGY 
IP CONSULT TO NEUROLOGY 
IP CONSULT TO CARDIOLOGY normal...

## 2023-03-08 NOTE — TELEPHONE ENCOUNTER
Long discussion with  Nigel Gaines. Patient' suggested medical agent. Will have ACP meeting tomorrow 0900. SA notified. <<--- Click to launch